# Patient Record
Sex: FEMALE | Race: WHITE | Employment: UNEMPLOYED | URBAN - METROPOLITAN AREA
[De-identification: names, ages, dates, MRNs, and addresses within clinical notes are randomized per-mention and may not be internally consistent; named-entity substitution may affect disease eponyms.]

---
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
## 2020-01-14 ENCOUNTER — OFFICE VISIT (OUTPATIENT)
Dept: INTERNAL MEDICINE | Age: 20
End: 2020-01-14
Payer: COMMERCIAL

## 2020-01-14 VITALS
HEIGHT: 63 IN | DIASTOLIC BLOOD PRESSURE: 86 MMHG | SYSTOLIC BLOOD PRESSURE: 139 MMHG | BODY MASS INDEX: 42.56 KG/M2 | WEIGHT: 240.2 LBS | HEART RATE: 90 BPM

## 2020-01-14 PROBLEM — E66.9 OBESITY WITHOUT SERIOUS COMORBIDITY IN PEDIATRIC PATIENT: Status: ACTIVE | Noted: 2020-01-14

## 2020-01-14 PROCEDURE — G8484 FLU IMMUNIZE NO ADMIN: HCPCS | Performed by: STUDENT IN AN ORGANIZED HEALTH CARE EDUCATION/TRAINING PROGRAM

## 2020-01-14 PROCEDURE — 99213 OFFICE O/P EST LOW 20 MIN: CPT | Performed by: STUDENT IN AN ORGANIZED HEALTH CARE EDUCATION/TRAINING PROGRAM

## 2020-01-14 PROCEDURE — G8427 DOCREV CUR MEDS BY ELIG CLIN: HCPCS | Performed by: STUDENT IN AN ORGANIZED HEALTH CARE EDUCATION/TRAINING PROGRAM

## 2020-01-14 PROCEDURE — 99211 OFF/OP EST MAY X REQ PHY/QHP: CPT | Performed by: INTERNAL MEDICINE

## 2020-01-14 PROCEDURE — 1036F TOBACCO NON-USER: CPT | Performed by: STUDENT IN AN ORGANIZED HEALTH CARE EDUCATION/TRAINING PROGRAM

## 2020-01-14 PROCEDURE — G8417 CALC BMI ABV UP PARAM F/U: HCPCS | Performed by: STUDENT IN AN ORGANIZED HEALTH CARE EDUCATION/TRAINING PROGRAM

## 2020-01-14 RX ORDER — DEXTROAMPHETAMINE SACCHARATE, AMPHETAMINE ASPARTATE, DEXTROAMPHETAMINE SULFATE AND AMPHETAMINE SULFATE 5; 5; 5; 5 MG/1; MG/1; MG/1; MG/1
40 TABLET ORAL DAILY
Qty: 60 TABLET | Refills: 0 | Status: SHIPPED | OUTPATIENT
Start: 2020-01-14 | End: 2020-01-20 | Stop reason: ALTCHOICE

## 2020-01-14 ASSESSMENT — PATIENT HEALTH QUESTIONNAIRE - PHQ9
SUM OF ALL RESPONSES TO PHQ9 QUESTIONS 1 & 2: 0
SUM OF ALL RESPONSES TO PHQ QUESTIONS 1-9: 0
SUM OF ALL RESPONSES TO PHQ QUESTIONS 1-9: 0
1. LITTLE INTEREST OR PLEASURE IN DOING THINGS: 0
2. FEELING DOWN, DEPRESSED OR HOPELESS: 0

## 2020-01-14 NOTE — PROGRESS NOTES
Attending Physician Statement  I have discussed the care of Aryan Dickson including pertinent history and exam findings,  with the resident. I have reviewed the key elements of all parts of the encounter with the resident. I agree with the assessment, plan and orders as documented by the resident.   (Glenis Bar)    Isha Rudolph MD  Faculty Internal Medicine/ Nephrology
Visit Information    Have you changed or started any medications since your last visit including any over-the-counter medicines, vitamins, or herbal medicines? no   Have you stopped taking any of your medications? Is so, why? -  no  Are you having any side effects from any of your medications? - no    Have you seen any other physician or provider since your last visit?  no   Have you had any other diagnostic tests since your last visit?  no   Have you been seen in the emergency room and/or had an admission in a hospital since we last saw you?  no   Have you had your routine dental cleaning in the past 6 months?  no     Do you have an active MyChart account? If no, what is the barrier?   Yes    Patient Care Team:  Mervat Jiménez MD as PCP - General (Internal Medicine)  Ivan Moon MD as Consulting Physician (Orthopedic Surgery)    Medical History Review  Past Medical, Family, and Social History reviewed and does contribute to the patient presenting condition    Health Maintenance   Topic Date Due    Pneumococcal 0-64 years Vaccine (1 of 1 - PPSV23) 05/03/2006    Flu vaccine (1) 09/01/2019    Chlamydia screen  10/01/2020    DTaP/Tdap/Td vaccine (7 - Td) 08/12/2021    HPV vaccine  Completed    Varicella Vaccine  Completed    Meningococcal (ACWY) Vaccine  Completed    HIV screen  Completed
Medications   Medication Sig Dispense Refill    albuterol sulfate  (90 Base) MCG/ACT inhaler Inhale 2 puffs into the lungs every 4 hours as needed for Wheezing 1 Inhaler 3    Spacer/Aero-Holding Chambers BERNARD 1 Device by Does not apply route daily 1 Device 0    loratadine (CLARITIN) 10 MG tablet TAKE ONE TABLET BY MOUTH DAILY 30 tablet 4    albuterol sulfate HFA (VENTOLIN HFA) 108 (90 Base) MCG/ACT inhaler Inhale 2 puffs into the lungs every 6 hours as needed for Wheezing 2 Inhaler 2    amphetamine-dextroamphetamine (ADDERALL XR) 20 MG extended release capsule Take 2 capsules by mouth every morning for 30 days. Start date 5/18/19 60 capsule 0     No current facility-administered medications for this visit. SOCIAL HISTORY    Reviewed and no change from previous record. Alex Otto  reports that she has never smoked.  She has never used smokeless tobacco.    FAMILY HISTORY:    Reviewed and No change from previous visit    REVIEW OF SYSTEMS:    CONSTITUTIONAL: Denies: fever, chills  PSYCH: Denies: anxiety, depression  ALLERGIES: Denies: urticaria  EYES: Denies: blurry vision, decreased vision, photophobia  ENT: Denies: sore throat, nasal congestion  CARDIOVASCULAR: Denies: chest pain, dyspnea on exertion  RESPIRATORY: Denies: cough, hemoptysis, shortness of breath  GI: Denies: Denies: abdominal pain, flank pain  : Denies: Denies: dysuria, frequency/urgency  NEURO: Denies: dizzy/vertigo, headache  MUSCULOSKELETAL: Denies: back pain, joint pain  SKIN: Denies: rash, itching    PHYSICAL EXAM:      Vitals:    01/14/20 1311   BP: 139/86   Site: Left Upper Arm   Position: Sitting   Cuff Size: Medium Adult   Pulse: 90   Weight: 240 lb 3.2 oz (109 kg)   Height: 5' 3\" (1.6 m)     BP Readings from Last 3 Encounters:   01/14/20 139/86   10/01/19 132/87   09/10/19 129/82      General appearance - alert, well appearing, and in no distress  Mental status - alert, oriented to person, place, and time  Mouth - mucous

## 2020-01-20 ENCOUNTER — TELEPHONE (OUTPATIENT)
Dept: INTERNAL MEDICINE | Age: 20
End: 2020-01-20

## 2020-01-20 RX ORDER — DEXTROAMPHETAMINE SACCHARATE, AMPHETAMINE ASPARTATE MONOHYDRATE, DEXTROAMPHETAMINE SULFATE AND AMPHETAMINE SULFATE 5; 5; 5; 5 MG/1; MG/1; MG/1; MG/1
40 CAPSULE, EXTENDED RELEASE ORAL EVERY MORNING
Qty: 60 CAPSULE | Refills: 0 | Status: SHIPPED | OUTPATIENT
Start: 2020-01-20 | End: 2020-01-21 | Stop reason: SDUPTHER

## 2020-01-21 RX ORDER — DEXTROAMPHETAMINE SACCHARATE, AMPHETAMINE ASPARTATE MONOHYDRATE, DEXTROAMPHETAMINE SULFATE AND AMPHETAMINE SULFATE 5; 5; 5; 5 MG/1; MG/1; MG/1; MG/1
40 CAPSULE, EXTENDED RELEASE ORAL EVERY MORNING
Qty: 60 CAPSULE | Refills: 0 | Status: SHIPPED | OUTPATIENT
Start: 2020-01-21 | End: 2020-02-18 | Stop reason: SDUPTHER

## 2020-01-21 RX ORDER — DEXTROAMPHETAMINE SACCHARATE, AMPHETAMINE ASPARTATE MONOHYDRATE, DEXTROAMPHETAMINE SULFATE AND AMPHETAMINE SULFATE 5; 5; 5; 5 MG/1; MG/1; MG/1; MG/1
40 CAPSULE, EXTENDED RELEASE ORAL EVERY MORNING
Qty: 60 CAPSULE | Refills: 0 | Status: SHIPPED | OUTPATIENT
Start: 2020-01-21 | End: 2020-01-21

## 2020-01-30 ENCOUNTER — OFFICE VISIT (OUTPATIENT)
Dept: OBGYN | Age: 20
End: 2020-01-30
Payer: COMMERCIAL

## 2020-01-30 VITALS
SYSTOLIC BLOOD PRESSURE: 138 MMHG | BODY MASS INDEX: 42.9 KG/M2 | WEIGHT: 242.2 LBS | DIASTOLIC BLOOD PRESSURE: 88 MMHG | HEART RATE: 88 BPM

## 2020-01-30 PROBLEM — R10.32 LEFT LOWER QUADRANT ABDOMINAL PAIN: Status: ACTIVE | Noted: 2020-01-30

## 2020-01-30 PROBLEM — N83.209 OVARIAN CYST: Status: ACTIVE | Noted: 2020-01-30

## 2020-01-30 PROCEDURE — G8427 DOCREV CUR MEDS BY ELIG CLIN: HCPCS | Performed by: STUDENT IN AN ORGANIZED HEALTH CARE EDUCATION/TRAINING PROGRAM

## 2020-01-30 PROCEDURE — G8484 FLU IMMUNIZE NO ADMIN: HCPCS | Performed by: STUDENT IN AN ORGANIZED HEALTH CARE EDUCATION/TRAINING PROGRAM

## 2020-01-30 PROCEDURE — 99211 OFF/OP EST MAY X REQ PHY/QHP: CPT | Performed by: STUDENT IN AN ORGANIZED HEALTH CARE EDUCATION/TRAINING PROGRAM

## 2020-01-30 PROCEDURE — 1036F TOBACCO NON-USER: CPT | Performed by: STUDENT IN AN ORGANIZED HEALTH CARE EDUCATION/TRAINING PROGRAM

## 2020-01-30 PROCEDURE — 99213 OFFICE O/P EST LOW 20 MIN: CPT | Performed by: STUDENT IN AN ORGANIZED HEALTH CARE EDUCATION/TRAINING PROGRAM

## 2020-01-30 PROCEDURE — G8417 CALC BMI ABV UP PARAM F/U: HCPCS | Performed by: STUDENT IN AN ORGANIZED HEALTH CARE EDUCATION/TRAINING PROGRAM

## 2020-01-30 NOTE — PROGRESS NOTES
History    Para Term  AB Living   0 0 0 0 0 0   SAB TAB Ectopic Molar Multiple Live Births   0 0 0 0 0 0       PAST MEDICAL HISTORY:      Diagnosis Date    Acute pyelonephritis  - 2004    ; normal cystogram and vcug     ADHD (attention deficit hyperactivity disorder) 2008    Allergic rhinitis 2010    Asthma 2011    abnormal PFT    Astigmatism     Obesity 9/3/2014    Pes planovalgus, acquired 2010    referred to ortho    Vesicoureteral reflux 04    normal cysto 06, and normal vcug 06. PAST SURGICAL HISTORY:                                                                    Procedure Laterality Date    FOOT SURGERY Left        MEDICATIONS:  Current Outpatient Medications   Medication Sig Dispense Refill    amphetamine-dextroamphetamine (ADDERALL XR) 20 MG extended release capsule Take 2 capsules by mouth every morning for 30 days. 60 capsule 0    albuterol sulfate  (90 Base) MCG/ACT inhaler Inhale 2 puffs into the lungs every 4 hours as needed for Wheezing 1 Inhaler 3    Spacer/Aero-Holding Chambers BERNARD 1 Device by Does not apply route daily 1 Device 0    loratadine (CLARITIN) 10 MG tablet TAKE ONE TABLET BY MOUTH DAILY 30 tablet 4     No current facility-administered medications for this visit. ALLERGIES:  Allergies as of 2020    (No Known Allergies)                                   VITALS:  Vitals:    20 1628   BP: 138/88   Site: Left Upper Arm   Position: Sitting   Cuff Size: Medium Adult   Pulse: 88   Weight: 242 lb 3.2 oz (109.9 kg)                                                                                                                                                                         PHYSICAL EXAM:     General Appearance: Appears healthy. Alert; in no acute distress.   Pleasant.; obese  Respiratory: clear to auscultation, no wheezes, rales or rhonchi, symmetric air entry  Cardiovascular: regular rate and rhythm, no murmurs rubs or gallops  Breast:  deferred to annual  Abdominal: Obese, negative tenderness to palpation with superficial and deep palpation, no inguinal or femoral hernias palpated bilaterally, no tenderness reproduced bilateral groin or inguinal regions. No abnormal masses. Normal umbilicus, no abdominal scars  Pelvic Exam: Declined repeat pelvic exam, previous pelvic exam did not visualize IUD strings. Rectal Exam: declined  Extremities: non-tender BLE and non-edematous  Musculoskeletal: Spine range of motion normal. Muscular strength intact. , Range of motion normal in hips, knees, shoulders, and spine. , no gross abnormalities  Psych: Normal. and Alert and oriented, appropriate affect. OMM EXAM:  The patient did not complain of a Chief complaint requiring OMM. Chief Complaint:none    Structural Exam: No Interest    DATA:  No results found for this visit on 01/30/20.   Narrative   EXAMINATION:   PELVIC ULTRASOUND       6/12/2019       TECHNIQUE:   Transabdominal and transvaginal pelvic ultrasound was performed with color   doppler flow evaluation.       COMPARISON:   None       HISTORY:   ORDERING SYSTEM PROVIDED HISTORY: Intrauterine contraceptive device threads   lost, initial encounter   TECHNOLOGIST PROVIDED HISTORY:   IUD strings not visualized       FINDINGS:       Measurements:       Uterus:  7.9 x 3.4 x 4.8 cm       Endometrial stripe:  Not well visualized due to IUD.       Right Ovary:  2.6 x 1.4 x 2.4 cm       Left Ovary:  6.8 x 2.3 x 2.6 cm           Ultrasound Findings:       Uterus: Uterus demonstrates normal myometrial echotexture.       Endometrial stripe: IUD with tip noted near the fundus.  It appears   appropriately position.  Small amount of fluid in the cervix.       Right Ovary: Right ovary is within normal limits.  There is normal arterial   and venous doppler flow.       Left Ovary:  2.0 x 2.4 x 1.4 cm simple left ovarian cyst.  There is normal   arterial and venous doppler flow.       Free Fluid: Trace free fluid which is likely physiologic.           Impression   IUD appears appropriately positioned.       Simple 2.0 x 2.4 x 1.4 cm left ovarian cyst which requires no further   follow-up.             ASSESSMENT & PLAN:    Isaac Davis is a 23 y.o. female  with LLQ abdominal cramping with  IUD (intrauterine device) in place  - [address]; Future  -Patient likens the intermittent abdominal cramping to her IUD  - Previous transvaginal ultrasound revealed appropriately positioned IUD, however had a simple left ovarian cyst at that time.  - Reassured patient that the IUD is likely not causing her pain and it is not cyclical in nature  - Patient may have some left lower quadrant pain secondary to history of left ovarian cyst, despite being small  - Discussed further management of potential cramping including removing the Mirena IUD with in office hysteroscopy utilizing Endosee or proceeding to the OR for an outpatient procedure. Patient declines both at this time and wishes to give the IUD more time to resolve the cramping.  - Patient desires to continue use of Mirena IUD at this time because it is working well for her   -Recommended alternating Tylenol 1000mg and motrin every 6 hours as needed for the pain  - She does not want the IUD pulled      Patient Active Problem List    Diagnosis Date Noted    IUD (intrauterine device) in place 2019    IUD strings lost 2019     TVUS 19 IUD in correct place near fundus      Eczema 2016    Congenital tarsal coalition 2016    Acne vulgaris 2015    Obesity 2014    Seasonal allergies 2012    Astigmatism     Pes planovalgus, acquired      Left.  ADHD (attention deficit hyperactivity disorder) 2008    Asthma 2006       Return in about 1 week for ultrasound and 2 weeks (around 2020) for follow up appointment.     Counseling Completed:  done need

## 2020-02-04 ENCOUNTER — ANCILLARY PROCEDURE (OUTPATIENT)
Dept: OBGYN | Age: 20
End: 2020-02-04
Payer: COMMERCIAL

## 2020-02-04 PROCEDURE — 76856 US EXAM PELVIC COMPLETE: CPT | Performed by: OBSTETRICS & GYNECOLOGY

## 2020-02-04 PROCEDURE — 76830 TRANSVAGINAL US NON-OB: CPT | Performed by: RADIOLOGY

## 2020-02-04 PROCEDURE — 76830 TRANSVAGINAL US NON-OB: CPT | Performed by: OBSTETRICS & GYNECOLOGY

## 2020-02-04 PROCEDURE — 76856 US EXAM PELVIC COMPLETE: CPT | Performed by: RADIOLOGY

## 2020-02-18 RX ORDER — DEXTROAMPHETAMINE SACCHARATE, AMPHETAMINE ASPARTATE MONOHYDRATE, DEXTROAMPHETAMINE SULFATE AND AMPHETAMINE SULFATE 5; 5; 5; 5 MG/1; MG/1; MG/1; MG/1
40 CAPSULE, EXTENDED RELEASE ORAL EVERY MORNING
Qty: 60 CAPSULE | Refills: 0 | Status: SHIPPED | OUTPATIENT
Start: 2020-02-18 | End: 2020-03-19 | Stop reason: SDUPTHER

## 2020-02-27 ENCOUNTER — OFFICE VISIT (OUTPATIENT)
Dept: OBGYN | Age: 20
End: 2020-02-27
Payer: COMMERCIAL

## 2020-02-27 VITALS
WEIGHT: 242 LBS | HEART RATE: 92 BPM | HEIGHT: 63 IN | SYSTOLIC BLOOD PRESSURE: 120 MMHG | BODY MASS INDEX: 42.88 KG/M2 | DIASTOLIC BLOOD PRESSURE: 79 MMHG

## 2020-02-27 PROCEDURE — 99213 OFFICE O/P EST LOW 20 MIN: CPT | Performed by: STUDENT IN AN ORGANIZED HEALTH CARE EDUCATION/TRAINING PROGRAM

## 2020-02-27 PROCEDURE — G8417 CALC BMI ABV UP PARAM F/U: HCPCS | Performed by: STUDENT IN AN ORGANIZED HEALTH CARE EDUCATION/TRAINING PROGRAM

## 2020-02-27 PROCEDURE — G8484 FLU IMMUNIZE NO ADMIN: HCPCS | Performed by: STUDENT IN AN ORGANIZED HEALTH CARE EDUCATION/TRAINING PROGRAM

## 2020-02-27 PROCEDURE — G8427 DOCREV CUR MEDS BY ELIG CLIN: HCPCS | Performed by: STUDENT IN AN ORGANIZED HEALTH CARE EDUCATION/TRAINING PROGRAM

## 2020-02-27 PROCEDURE — 1036F TOBACCO NON-USER: CPT | Performed by: STUDENT IN AN ORGANIZED HEALTH CARE EDUCATION/TRAINING PROGRAM

## 2020-02-27 NOTE — PROGRESS NOTES
body/fundus. Right Ovary: Right ovary is within normal limits. Left Ovary:  Left ovary is not visualized. Free Fluid: No evidence of free fluid. Nonvisualized left ovary, otherwise negative pelvic ultrasound with satisfactory appearing IUD. Narrative   EXAMINATION:   PELVIC ULTRASOUND       2019       TECHNIQUE:   Transabdominal and transvaginal pelvic ultrasound was performed with color   doppler flow evaluation.       COMPARISON:   None       HISTORY:   ORDERING SYSTEM PROVIDED HISTORY: Intrauterine contraceptive device threads   lost, initial encounter   TECHNOLOGIST PROVIDED HISTORY:   IUD strings not visualized       FINDINGS:       Measurements:       Uterus:  7.9 x 3.4 x 4.8 cm       Endometrial stripe:  Not well visualized due to IUD.       Right Ovary:  2.6 x 1.4 x 2.4 cm       Left Ovary:  6.8 x 2.3 x 2.6 cm           Ultrasound Findings:       Uterus: Uterus demonstrates normal myometrial echotexture.       Endometrial stripe: IUD with tip noted near the fundus.  It appears   appropriately position.  Small amount of fluid in the cervix.       Right Ovary: Right ovary is within normal limits.  There is normal arterial   and venous doppler flow.       Left Ovary:  2.0 x 2.4 x 1.4 cm simple left ovarian cyst.  There is normal   arterial and venous doppler flow.       Free Fluid: Trace free fluid which is likely physiologic.           Impression   IUD appears appropriately positioned.       Simple 2.0 x 2.4 x 1.4 cm left ovarian cyst which requires no further   follow-up.             ASSESSMENT & PLAN:    Rain Blanco is a 23 y.o. female  with LLQ abdominal cramping with  IUD (intrauterine device) in place  - pain was likely due to left ovarian cyst (likely resolved) as left ovary was not seen on US  - Previous transvaginal ultrasound revealed appropriately positioned IUD, however had a simple left ovarian cyst at that time (2019).   - Reassured patient that the IUD is likely not recommendation for cessation and avoidance. Routine health maintenance per patients PCP done. Patient was seen with total face to face time of 15 minutes. More than 50% of this visit was on counseling and education regarding her diagnose(s) as listed below and options. She was also counseled on her preventative health maintenance recommendations and follow-up. Diagnosis Orders   1. IUD (intrauterine device) in place     2. Cyst of left ovary     3. Left lower quadrant abdominal pain     4.  [address], DO  Ob/Gyn Resident  [facility], [address]  2/27/2020, 3:27 PM

## 2020-02-27 NOTE — PROGRESS NOTES
Attending Physician Statement  I have discussed the care of Evan Snyder, including pertinent history and exam findings,  with the resident. I have reviewed the key elements of all parts of the encounter with the resident. I agree with the assessment, plan and orders as documented by the resident.   (GE Modifier)

## 2020-02-28 ENCOUNTER — OFFICE VISIT (OUTPATIENT)
Dept: INTERNAL MEDICINE | Age: 20
End: 2020-02-28
Payer: COMMERCIAL

## 2020-02-28 VITALS
HEART RATE: 89 BPM | WEIGHT: 242.4 LBS | SYSTOLIC BLOOD PRESSURE: 137 MMHG | DIASTOLIC BLOOD PRESSURE: 100 MMHG | HEIGHT: 63 IN | BODY MASS INDEX: 42.95 KG/M2

## 2020-02-28 PROCEDURE — 1036F TOBACCO NON-USER: CPT | Performed by: STUDENT IN AN ORGANIZED HEALTH CARE EDUCATION/TRAINING PROGRAM

## 2020-02-28 PROCEDURE — 99214 OFFICE O/P EST MOD 30 MIN: CPT | Performed by: STUDENT IN AN ORGANIZED HEALTH CARE EDUCATION/TRAINING PROGRAM

## 2020-02-28 PROCEDURE — 99211 OFF/OP EST MAY X REQ PHY/QHP: CPT | Performed by: INTERNAL MEDICINE

## 2020-02-28 PROCEDURE — G8417 CALC BMI ABV UP PARAM F/U: HCPCS | Performed by: STUDENT IN AN ORGANIZED HEALTH CARE EDUCATION/TRAINING PROGRAM

## 2020-02-28 PROCEDURE — G8484 FLU IMMUNIZE NO ADMIN: HCPCS | Performed by: STUDENT IN AN ORGANIZED HEALTH CARE EDUCATION/TRAINING PROGRAM

## 2020-02-28 PROCEDURE — G8427 DOCREV CUR MEDS BY ELIG CLIN: HCPCS | Performed by: STUDENT IN AN ORGANIZED HEALTH CARE EDUCATION/TRAINING PROGRAM

## 2020-02-28 RX ORDER — DIAPER,BRIEF,INFANT-TODD,DISP
EACH MISCELLANEOUS
Qty: 1 TUBE | Refills: 1 | Status: SHIPPED | OUTPATIENT
Start: 2020-02-28 | End: 2020-03-06

## 2020-02-28 RX ORDER — ALBUTEROL SULFATE 90 UG/1
2 AEROSOL, METERED RESPIRATORY (INHALATION) EVERY 4 HOURS PRN
Qty: 1 INHALER | Refills: 3 | Status: CANCELLED | OUTPATIENT
Start: 2020-02-28

## 2020-02-28 RX ORDER — LORATADINE 10 MG/1
TABLET ORAL
Qty: 30 TABLET | Refills: 4 | Status: CANCELLED | OUTPATIENT
Start: 2020-02-28

## 2020-02-28 RX ORDER — IODINE/SODIUM IODIDE 2 %
1 TINCTURE TOPICAL PRN
Qty: 1 BOTTLE | Refills: 2 | Status: SHIPPED | OUTPATIENT
Start: 2020-02-28 | End: 2020-08-17

## 2020-02-28 RX ORDER — DEXTROAMPHETAMINE SACCHARATE, AMPHETAMINE ASPARTATE MONOHYDRATE, DEXTROAMPHETAMINE SULFATE AND AMPHETAMINE SULFATE 5; 5; 5; 5 MG/1; MG/1; MG/1; MG/1
40 CAPSULE, EXTENDED RELEASE ORAL EVERY MORNING
Qty: 60 CAPSULE | Refills: 0 | Status: CANCELLED | OUTPATIENT
Start: 2020-02-28 | End: 2020-03-29

## 2020-02-28 NOTE — PROGRESS NOTES
Attending Physician Statement  I have discussed the care of Aryan Dickson including pertinent history and exam findings,  with the resident. I have reviewed the key elements of all parts of the encounter with the resident. I agree with the assessment, plan and orders as documented by the resident.   (Christie Henriquez)    Nathan Brandon MD  Faculty Internal Medicine/ Nephrology

## 2020-02-28 NOTE — PROGRESS NOTES
@[facility]@    [facility]    Progress Note    Date of patient's visit: 2/28/2020    Patient's Name:  Shaan Kearns    YOB: 2000            Patient Care Team:  Deann Todd MD as PCP - General (Internal Medicine)  Praveen Bryant MD as Consulting Physician (Orthopedic Surgery)    REASON FOR VISIT: Routine outpatient follow up    HISTORY OF PRESENT ILLNESS:    History was obtained from the patient. Shaan Kearns is a 23 y.o. female with past medical history of  -ADHD, on adderall 20 mg XR capsule  -Asthma, no recent exacerbation or use of rescue inhalers. Last seen in the office for follow-up of ADHD and health maintenance on 1/14/2020. Today, the patient came in with chief complaint of a rash that started few days back. She describes the rash as scattered, round, varying in size from coin shaped to pea-sized lesions, erythematous, itchy, localized to bilateral feet and lower part of bilateral legs. She denies any bleeding or purulent drainage from the site. She denies any recent travels, recent change in medications, associated sick contacts, other associated symptoms. She denies having similar rash in the past.  She has past medical history of asthma, however no recent exacerbations, no use of rescue inhalers. Today vitals revealed blood pressure 149/79 mmHg, 127/100 mmHg on repeat testing. She has class II obesity, has been counseled on weight loss and balanced diet. Past Medical History:   Diagnosis Date    Acute pyelonephritis 11/8 - 11/11/2004    ; normal cystogram and vcug 2006    ADHD (attention deficit hyperactivity disorder) 5/2008    Allergic rhinitis 8/2010    Asthma 8/2011    abnormal PFT    Astigmatism     Obesity 9/3/2014    Pes planovalgus, acquired 12/2010    referred to ortho    Vesicoureteral reflux 11/9/04    normal cysto 11/2/06, and normal vcug 5/19/06.        Past Surgical History: Procedure Laterality Date    FOOT SURGERY Left          ALLERGIES    No Known Allergies    MEDICATIONS:      Current Outpatient Medications on File Prior to Visit   Medication Sig Dispense Refill    amphetamine-dextroamphetamine (ADDERALL XR) 20 MG extended release capsule Take 2 capsules by mouth every morning for 30 days. 60 capsule 0    albuterol sulfate  (90 Base) MCG/ACT inhaler Inhale 2 puffs into the lungs every 4 hours as needed for Wheezing 1 Inhaler 3    Spacer/Aero-Holding Chambers BERNARD 1 Device by Does not apply route daily 1 Device 0    loratadine (CLARITIN) 10 MG tablet TAKE ONE TABLET BY MOUTH DAILY 30 tablet 4     No current facility-administered medications on file prior to visit. SOCIAL HISTORY    Reviewed and no change from previous record. Laurell Claude  reports that she has never smoked. She has never used smokeless tobacco.    FAMILY HISTORY:    Reviewed and No change from previous visit    HEALTH MAINTENANCE DUE:      Health Maintenance Due   Topic Date Due    Pneumococcal 0-64 years Vaccine (1 of 1 - PPSV23) 05/03/2006       REVIEW OF SYSTEMS:    12 point review of symptoms completed and found to be normal except noted in the HPI    · Constitutional: Negative for Fever, chills  · Eyes: Negative for visual changes, diplopia  · ENT: Negative for mouth sores, sore throat. · Cardiovascular: Negative for lightheadedness ,chest pain, palpitations   · Respiratory:Negative for Shortness of breath,cough or wheezing. · Gastrointestinal: Negative for nausea/vomiting, change in bowel habits, abdominal pain  · Genitourinary:Negative for change in bladder habits, dysuria, hematuria.   · Musculoskeletal: Negative for joint pain   · Neurological: Negative for headache, change in muscle strength numbness/tingling       PHYSICAL EXAM:      Vitals:    02/28/20 1548 02/28/20 1602   BP: (!) 149/79 (!) 137/100   Site: Left Upper Arm    Position: Sitting    Cuff Size: Medium Adult    Pulse: 93 89 allergy  - Calamine 8-8 % LOTN lotion; Apply 1 applicator topically as needed (Rash, itching)  Dispense: 1 Bottle; Refill: 2  - hydrocortisone (ALA-THEODORE) 1 % cream; Apply topically 2 times daily. Dispense: 1 Tube; Refill: 1  - To rule out autoimmune cause in case of progression of the rash - DEMARIO Screen With Reflex; Future, Rheumatoid Factor    2. Attention deficit hyperactivity disorder (ADHD), other type  - Continue Adderall - refill provided in previous visit in Jan 2020    3. Mild intermittent asthma without complication  - No recent use or rescue inhalers or exacerbation    4. H/O Seasonal allergies    5. Class 2 obesity due to excess calories without serious comorbidity with body mass index (BMI) of 38.0 to 38.9 in adult      FOLLOW UP AND INSTRUCTIONS:   Follow up in 6 months, return in case of worsening of the rash. 1. Tiffanie received counseling on the following healthy behaviors: exercise, nutrition. 2. Reviewed prior labs and health maintenance. 3. Discussed use, benefit, and side effects of prescribed medications. Barriers to medication compliance addressed. All patient questions answered. Pt voiced understanding.      4. Patient given educational materials - see patient instructions      Ratna Gleason MD      Department of Internal Medicine  [facility]         2/28/2020, 4:22 PM

## 2020-03-17 NOTE — TELEPHONE ENCOUNTER
Request for albuterol, adderall - medications pended. Please fill if appropriate. I am routing to you as you can e-scribe the adderall to avoid delay.       Next Visit Date:  Future Appointments   Date Time Provider Suyapa Thomas   6/30/2020  1:00 PM Edmond Spurling, MD [address] [place] [address] [address] Maintenance   Topic Date Due    Pneumococcal 0-64 years Vaccine (1 of 1 - PPSV23) 05/03/2006    Flu vaccine (1) 01/30/2021 (Originally 9/1/2019)    Chlamydia screen  10/01/2020    DTaP/Tdap/Td vaccine (7 - Td) 08/12/2021    Shingles Vaccine (1 of 2) 05/03/2050    Hepatitis A vaccine  Completed    Hepatitis B vaccine  Completed    Hib vaccine  Completed    HPV vaccine  Completed    Varicella vaccine  Completed    Meningococcal (ACWY) vaccine  Completed    HIV screen  Completed       Hemoglobin A1C (%)   Date Value   09/13/2019 4.9             ( goal A1C is < 7)   No results found for: LABMICR  LDL Cholesterol (mg/dL)   Date Value   09/13/2019 107       (goal LDL is <100)   No results found for: AST, ALT, BUN  BP Readings from Last 3 Encounters:   02/28/20 (!) 137/100   02/27/20 120/79   01/30/20 138/88          (goal 120/80)    All Future Testing planned in [facility]  Lab Frequency Next Occurrence   DEMARIO Screen With Reflex Once 05/09/2020   Rheumatoid Factor Once 05/09/2020         Patient Active Problem List:     ADHD (attention deficit hyperactivity disorder)     Asthma     Pes planovalgus, acquired     Astigmatism     Seasonal allergies     Obesity     Acne vulgaris     Congenital tarsal coalition     Eczema     IUD strings lost     IUD (intrauterine device) in place     Left lower quadrant abdominal pain     Ovarian cyst

## 2020-03-19 RX ORDER — DEXTROAMPHETAMINE SACCHARATE, AMPHETAMINE ASPARTATE MONOHYDRATE, DEXTROAMPHETAMINE SULFATE AND AMPHETAMINE SULFATE 5; 5; 5; 5 MG/1; MG/1; MG/1; MG/1
40 CAPSULE, EXTENDED RELEASE ORAL EVERY MORNING
Qty: 60 CAPSULE | Refills: 0 | Status: SHIPPED | OUTPATIENT
Start: 2020-03-19 | End: 2020-04-17 | Stop reason: SDUPTHER

## 2020-03-19 RX ORDER — ALBUTEROL SULFATE 90 UG/1
2 AEROSOL, METERED RESPIRATORY (INHALATION) EVERY 4 HOURS PRN
Qty: 1 INHALER | Refills: 3 | Status: SHIPPED | OUTPATIENT
Start: 2020-03-19 | End: 2020-04-17 | Stop reason: SDUPTHER

## 2020-04-20 RX ORDER — DEXTROAMPHETAMINE SACCHARATE, AMPHETAMINE ASPARTATE MONOHYDRATE, DEXTROAMPHETAMINE SULFATE AND AMPHETAMINE SULFATE 5; 5; 5; 5 MG/1; MG/1; MG/1; MG/1
40 CAPSULE, EXTENDED RELEASE ORAL EVERY MORNING
Qty: 60 CAPSULE | Refills: 0 | Status: SHIPPED | OUTPATIENT
Start: 2020-04-20 | End: 2020-04-21 | Stop reason: SDUPTHER

## 2020-04-20 RX ORDER — ALBUTEROL SULFATE 90 UG/1
2 AEROSOL, METERED RESPIRATORY (INHALATION) EVERY 4 HOURS PRN
Qty: 1 INHALER | Refills: 3 | Status: SHIPPED | OUTPATIENT
Start: 2020-04-20 | End: 2020-07-14 | Stop reason: SDUPTHER

## 2020-04-21 RX ORDER — DEXTROAMPHETAMINE SACCHARATE, AMPHETAMINE ASPARTATE MONOHYDRATE, DEXTROAMPHETAMINE SULFATE AND AMPHETAMINE SULFATE 5; 5; 5; 5 MG/1; MG/1; MG/1; MG/1
40 CAPSULE, EXTENDED RELEASE ORAL EVERY MORNING
Qty: 60 CAPSULE | Refills: 0 | Status: SHIPPED | OUTPATIENT
Start: 2020-04-21 | End: 2020-05-09 | Stop reason: SDUPTHER

## 2020-06-25 RX ORDER — DEXTROAMPHETAMINE SACCHARATE, AMPHETAMINE ASPARTATE MONOHYDRATE, DEXTROAMPHETAMINE SULFATE AND AMPHETAMINE SULFATE 5; 5; 5; 5 MG/1; MG/1; MG/1; MG/1
40 CAPSULE, EXTENDED RELEASE ORAL EVERY MORNING
Qty: 60 CAPSULE | Refills: 0 | Status: SHIPPED | OUTPATIENT
Start: 2020-06-25 | End: 2020-07-21 | Stop reason: SDUPTHER

## 2020-06-26 RX ORDER — DEXTROAMPHETAMINE SACCHARATE, AMPHETAMINE ASPARTATE MONOHYDRATE, DEXTROAMPHETAMINE SULFATE AND AMPHETAMINE SULFATE 5; 5; 5; 5 MG/1; MG/1; MG/1; MG/1
40 CAPSULE, EXTENDED RELEASE ORAL EVERY MORNING
Qty: 60 CAPSULE | Refills: 0 | OUTPATIENT
Start: 2020-06-26 | End: 2020-07-26

## 2020-07-13 NOTE — TELEPHONE ENCOUNTER
Pt calling for medication refill albuterol inhaler. She missed her last appt and is unable to reschedule at this time because she doesn't know her work schedule. Pt would also like an prescription strength dorant, she has tried every single OTC kind and nothing is helping.

## 2020-07-14 RX ORDER — ALBUTEROL SULFATE 90 UG/1
2 AEROSOL, METERED RESPIRATORY (INHALATION) EVERY 4 HOURS PRN
Qty: 1 INHALER | Refills: 3 | Status: SHIPPED | OUTPATIENT
Start: 2020-07-14

## 2020-07-14 NOTE — TELEPHONE ENCOUNTER
Abhishek Kay,  What do yo mean by Ryan Kwan? I refilled her albuterol.     Thank you    Julio Viramontes MD, PGY-2  Internal Medicine Residency Program  [facility]  7/14/2020 3:01 PM

## 2020-07-22 NOTE — TELEPHONE ENCOUNTER
Request for adderall - med pended. Please fill if appropriate. Next Visit Date:  No future appointments.     Health Maintenance   Topic Date Due    Pneumococcal 0-64 years Vaccine (1 of 1 - PPSV23) 05/03/2006    Flu vaccine (1) 09/01/2020    Chlamydia screen  10/01/2020    DTaP/Tdap/Td vaccine (7 - Td) 08/12/2021    Hepatitis A vaccine  Completed    Hepatitis B vaccine  Completed    Hib vaccine  Completed    HPV vaccine  Completed    Varicella vaccine  Completed    Meningococcal (ACWY) vaccine  Completed    HIV screen  Completed       Hemoglobin A1C (%)   Date Value   09/13/2019 4.9             ( goal A1C is < 7)   No results found for: LABMICR  LDL Cholesterol (mg/dL)   Date Value   09/13/2019 107       (goal LDL is <100)   No results found for: AST, ALT, BUN  BP Readings from Last 3 Encounters:   02/28/20 (!) 137/100   02/27/20 120/79   01/30/20 138/88          (goal 120/80)    All Future Testing planned in [facility]  Lab Frequency Next Occurrence   DEMARIO Screen With Reflex Once 10/13/2020   Rheumatoid Factor Once 10/13/2020         Patient Active Problem List:     ADHD (attention deficit hyperactivity disorder)     Asthma     Pes planovalgus, acquired     Astigmatism     Seasonal allergies     Obesity     Acne vulgaris     Congenital tarsal coalition     Eczema     IUD strings lost     IUD (intrauterine device) in place     Left lower quadrant abdominal pain     Ovarian cyst

## 2020-07-24 RX ORDER — DEXTROAMPHETAMINE SACCHARATE, AMPHETAMINE ASPARTATE MONOHYDRATE, DEXTROAMPHETAMINE SULFATE AND AMPHETAMINE SULFATE 5; 5; 5; 5 MG/1; MG/1; MG/1; MG/1
40 CAPSULE, EXTENDED RELEASE ORAL EVERY MORNING
Qty: 60 CAPSULE | Refills: 0 | Status: SHIPPED | OUTPATIENT
Start: 2020-07-24 | End: 2020-08-17 | Stop reason: SDUPTHER

## 2020-07-24 RX ORDER — DEXTROAMPHETAMINE SACCHARATE, AMPHETAMINE ASPARTATE MONOHYDRATE, DEXTROAMPHETAMINE SULFATE AND AMPHETAMINE SULFATE 5; 5; 5; 5 MG/1; MG/1; MG/1; MG/1
40 CAPSULE, EXTENDED RELEASE ORAL EVERY MORNING
Qty: 60 CAPSULE | Refills: 0 | Status: SHIPPED | OUTPATIENT
Start: 2020-07-24 | End: 2020-07-24

## 2020-07-24 RX ORDER — DEXTROAMPHETAMINE SACCHARATE, AMPHETAMINE ASPARTATE MONOHYDRATE, DEXTROAMPHETAMINE SULFATE AND AMPHETAMINE SULFATE 5; 5; 5; 5 MG/1; MG/1; MG/1; MG/1
40 CAPSULE, EXTENDED RELEASE ORAL EVERY MORNING
Qty: 60 CAPSULE | Refills: 0 | OUTPATIENT
Start: 2020-07-24 | End: 2020-08-23

## 2020-07-27 NOTE — TELEPHONE ENCOUNTER
UNABLE TO LOCATE ORIGIAL SCRIPT FROM 07/24/2020 FROM DR SMITH'S [facility]

## 2020-07-28 NOTE — TELEPHONE ENCOUNTER
Printed script found, PC to pt LM on VM that script is ready for .  Printed script taken downstairs at screen desk

## 2020-08-17 ENCOUNTER — OFFICE VISIT (OUTPATIENT)
Dept: INTERNAL MEDICINE | Age: 20
End: 2020-08-17
Payer: COMMERCIAL

## 2020-08-17 ENCOUNTER — HOSPITAL ENCOUNTER (OUTPATIENT)
Age: 20
Setting detail: SPECIMEN
Discharge: HOME | End: 2020-08-17
Payer: COMMERCIAL

## 2020-08-17 VITALS
HEART RATE: 66 BPM | DIASTOLIC BLOOD PRESSURE: 82 MMHG | WEIGHT: 231.2 LBS | BODY MASS INDEX: 38.52 KG/M2 | HEIGHT: 65 IN | SYSTOLIC BLOOD PRESSURE: 121 MMHG

## 2020-08-17 LAB — RHEUMATOID FACTOR: <10 IU/ML

## 2020-08-17 PROCEDURE — 99213 OFFICE O/P EST LOW 20 MIN: CPT | Performed by: INTERNAL MEDICINE

## 2020-08-17 PROCEDURE — 99211 OFF/OP EST MAY X REQ PHY/QHP: CPT | Performed by: INTERNAL MEDICINE

## 2020-08-17 PROCEDURE — 1036F TOBACCO NON-USER: CPT | Performed by: INTERNAL MEDICINE

## 2020-08-17 PROCEDURE — G8417 CALC BMI ABV UP PARAM F/U: HCPCS | Performed by: INTERNAL MEDICINE

## 2020-08-17 PROCEDURE — G8427 DOCREV CUR MEDS BY ELIG CLIN: HCPCS | Performed by: INTERNAL MEDICINE

## 2020-08-17 RX ORDER — LORATADINE 10 MG/1
10 TABLET ORAL DAILY
Qty: 30 TABLET | Refills: 4 | Status: SHIPPED | OUTPATIENT
Start: 2020-08-17 | End: 2020-11-21 | Stop reason: SDUPTHER

## 2020-08-17 RX ORDER — DEXTROAMPHETAMINE SACCHARATE, AMPHETAMINE ASPARTATE MONOHYDRATE, DEXTROAMPHETAMINE SULFATE AND AMPHETAMINE SULFATE 5; 5; 5; 5 MG/1; MG/1; MG/1; MG/1
40 CAPSULE, EXTENDED RELEASE ORAL EVERY MORNING
Qty: 60 CAPSULE | Refills: 0 | Status: SHIPPED | OUTPATIENT
Start: 2020-08-17 | End: 2020-09-15 | Stop reason: SDUPTHER

## 2020-08-17 RX ORDER — ALBUTEROL SULFATE 2.5 MG/3ML
2.5 SOLUTION RESPIRATORY (INHALATION) EVERY 6 HOURS PRN
Qty: 120 EACH | Refills: 4 | Status: SHIPPED | OUTPATIENT
Start: 2020-08-17

## 2020-08-17 RX ORDER — HYDROCODONE BITARTRATE 100 %
1 POWDER (GRAM) MISCELLANEOUS NIGHTLY
Qty: 500 G | Refills: 0 | Status: SHIPPED | OUTPATIENT
Start: 2020-08-17 | End: 2021-03-23

## 2020-08-17 RX ORDER — NAPROXEN 500 MG/1
TABLET ORAL PRN
COMMUNITY
Start: 2020-07-17 | End: 2020-08-20 | Stop reason: SDUPTHER

## 2020-08-17 RX ORDER — DIAPER,BRIEF,INFANT-TODD,DISP
EACH MISCELLANEOUS
COMMUNITY
Start: 2020-08-11 | End: 2021-07-01 | Stop reason: ALTCHOICE

## 2020-08-17 NOTE — PROGRESS NOTES
[facility]      Date of Patient's Visit: 8/17/2020    Progress note    Patient Care Team:  Sera Marshall MD as PCP - General (Internal Medicine)  Vania Juarez MD as Consulting Physician (Orthopedic Surgery)      CHIEF COMPLAINT  Chief Complaint   Patient presents with   Chung Richards New Doctor   [facility] Maintenance     decline pneu    Excessive Sweating     under arm monk, wants medication    Medication Refill     for breathing machine       SUBJECTIVE  Tia Fossa is a 21 y.o. female who presents f/u fpor adderral refill   No signs of side effects of abuse   She has lost slight weight but that fluctuates   She works at [employer] and works day shifts . She has tried low doses in the past and is currently refusing to try 30 mg dose    C/o hyperhidrosis without complications of folliculitis   She has tried many over the counter medications     Rash , erythema on face, nasal bridge and legs  ,  DEMARIO still pending from last visit       ROS  All other review of systems negative, except for those noted. Review of Systems    Past Medical History:   Diagnosis Date    Acute pyelonephritis 11/8 - 11/11/2004    ; normal cystogram and vcug 2006    ADHD (attention deficit hyperactivity disorder) 5/2008    Allergic rhinitis 8/2010    Asthma 8/2011    abnormal PFT    Astigmatism     Obesity 9/3/2014    Pes planovalgus, acquired 12/2010    referred to ortho    Vesicoureteral reflux 11/9/04    normal cysto 11/2/06, and normal vcug 5/19/06.        Past Surgical History:   Procedure Laterality Date    FOOT SURGERY Left        Family History   Problem Relation Age of Onset    Allergies Mother     Obesity Father     Allergies Brother     Other Brother         learning problems    Asthma Brother     Arthritis Neg Hx     Birth Defects Neg Hx     Cancer Neg Hx     Depression Neg Hx     Diabetes Neg Hx     Early Death Neg Hx     Hearing Loss Neg Hx     NA, K, CL, CO2, BUN, CREATININE, GLUCOSE  HEMOGLOBIN A1C:   Lab Results   Component Value Date    LABA1C 4.9 09/13/2019     MICROALBUMIN URINE: No results found for: MICROALBUR  FASTING LIPID PANEL:   Lab Results   Component Value Date    CHOL 184 09/13/2019    HDL 51 09/13/2019    TRIG 129 09/13/2019     Lab Results   Component Value Date    LDLCHOLESTEROL 107 09/13/2019     LIVER PROFILE: No results found for: ALT, AST, PROT, BILITOT, BILIDIR, LABALBU   THYROID FUNCTION: No results found for: TSH   URINE ANALYSIS: No results found for: [address]    1. Attention deficit hyperactivity disorder (ADHD), other type    - amphetamine-dextroamphetamine (ADDERALL XR) 20 MG extended release capsule; Take 2 capsules by mouth every morning for 30 days. Dispense: 60 capsule; Refill: 0    2. Morbidly obese ([address]      3. Mild intermittent asthma without complication    - albuterol (PROVENTIL) (2.5 MG/3ML) 0.083% nebulizer solution; Take 3 mLs by nebulization every 6 hours as needed for Wheezing  Dispense: 120 each; Refill: 4  - loratadine (CLARITIN) 10 MG tablet; Take 1 tablet by mouth daily  Dispense: 30 tablet; Refill: 4    4. Hyperhidrosis of axilla  - Aluminum Chloride Hexahydrate POWD; 1 Applicatorful by Does not apply route nightly  Dispense: 500 g; Refill: 0            Follow up Instructions:    No follow-ups on file. 1. Reviewed prior labs and health maintenance. 2. Discussed use, benefit, and side effects of prescribed medications. Barriers to medication compliance addressed. All patient questions answered. Pt voiced understanding.      3. Patient given educational materials - see patient instructions      MD FANNY Gray  Attending Physician, [address] [place] [address], Internal Medicine Residency Program  [address] [place] [address]  8/17/2020, 12:37 PM

## 2020-08-18 LAB — ANTI-NUCLEAR ANTIBODY (ANA): NEGATIVE

## 2020-08-20 NOTE — TELEPHONE ENCOUNTER
Request for naproxen - med pended. Please fill if appropriate. Next Visit Date:  No future appointments.     Health Maintenance   Topic Date Due    Pneumococcal 0-64 years Vaccine (1 of 1 - PPSV23) 08/17/2021 (Originally 5/3/2006)    Flu vaccine (1) 09/01/2020    Chlamydia screen  10/01/2020    DTaP/Tdap/Td vaccine (7 - Td) 08/12/2021    Hepatitis A vaccine  Completed    Hepatitis B vaccine  Completed    Hib vaccine  Completed    HPV vaccine  Completed    Varicella vaccine  Completed    Meningococcal (ACWY) vaccine  Completed    HIV screen  Completed       Hemoglobin A1C (%)   Date Value   09/13/2019 4.9             ( goal A1C is < 7)   No results found for: LABMICR  LDL Cholesterol (mg/dL)   Date Value   09/13/2019 107       (goal LDL is <100)   No results found for: AST, ALT, BUN  BP Readings from Last 3 Encounters:   08/17/20 121/82   02/28/20 (!) 137/100   02/27/20 120/79          (goal 120/80)    All Future Testing planned in [facility]        Patient Active Problem List:     ADHD (attention deficit hyperactivity disorder)     Asthma     Pes planovalgus, acquired     Astigmatism     Seasonal allergies     Morbidly obese (HCC)     Acne vulgaris     Congenital tarsal coalition     Eczema     IUD strings lost     IUD (intrauterine device) in place     Left lower quadrant abdominal pain     Ovarian cyst

## 2020-08-21 RX ORDER — NAPROXEN 500 MG/1
500 TABLET ORAL 2 TIMES DAILY WITH MEALS
Qty: 60 TABLET | Refills: 6 | Status: SHIPPED | OUTPATIENT
Start: 2020-08-21

## 2020-09-15 NOTE — TELEPHONE ENCOUNTER
Refill for adderall           Next Visit Date:  No future appointments.     Health Maintenance   Topic Date Due    Flu vaccine (1) 09/01/2020    Chlamydia screen  10/01/2020    Pneumococcal 0-64 years Vaccine (1 of 1 - PPSV23) 08/17/2021 (Originally 5/3/2006)    DTaP/Tdap/Td vaccine (7 - Td) 08/12/2021    Hepatitis A vaccine  Completed    Hepatitis B vaccine  Completed    Hib vaccine  Completed    HPV vaccine  Completed    Varicella vaccine  Completed    Meningococcal (ACWY) vaccine  Completed    HIV screen  Completed       Hemoglobin A1C (%)   Date Value   09/13/2019 4.9             ( goal A1C is < 7)   No results found for: LABMICR  LDL Cholesterol (mg/dL)   Date Value   09/13/2019 107       (goal LDL is <100)   No results found for: AST, ALT, BUN  BP Readings from Last 3 Encounters:   08/17/20 121/82   02/28/20 (!) 137/100   02/27/20 120/79          (goal 120/80)    All Future Testing planned in [facility]              Patient Active Problem List:     ADHD (attention deficit hyperactivity disorder)     Asthma     Pes planovalgus, acquired     Astigmatism     Seasonal allergies     Morbidly obese (HCC)     Acne vulgaris     Congenital tarsal coalition     Eczema     IUD strings lost     IUD (intrauterine device) in place     Left lower quadrant abdominal pain     Ovarian cyst

## 2020-09-18 RX ORDER — DEXTROAMPHETAMINE SACCHARATE, AMPHETAMINE ASPARTATE MONOHYDRATE, DEXTROAMPHETAMINE SULFATE AND AMPHETAMINE SULFATE 5; 5; 5; 5 MG/1; MG/1; MG/1; MG/1
40 CAPSULE, EXTENDED RELEASE ORAL EVERY MORNING
Qty: 60 CAPSULE | Refills: 0 | Status: SHIPPED | OUTPATIENT
Start: 2020-09-18 | End: 2020-10-22 | Stop reason: SDUPTHER

## 2020-10-07 ENCOUNTER — HOSPITAL ENCOUNTER (OUTPATIENT)
Age: 20
Setting detail: SPECIMEN
Discharge: HOME | End: 2020-10-07
Payer: COMMERCIAL

## 2020-10-07 LAB
ABSOLUTE EOS #: 0.06 K/UL (ref 0–0.44)
ABSOLUTE IMMATURE GRANULOCYTE: <0.03 K/UL (ref 0–0.3)
ABSOLUTE LYMPH #: 1.17 K/UL (ref 1.2–5.2)
ABSOLUTE MONO #: 0.39 K/UL (ref 0.1–1.4)
ALT SERPL-CCNC: 10 U/L (ref 5–33)
BASOPHILS # BLD: 0 % (ref 0–2)
BASOPHILS ABSOLUTE: <0.03 K/UL (ref 0–0.2)
CREAT SERPL-MCNC: 0.57 MG/DL (ref 0.5–0.9)
DIFFERENTIAL TYPE: ABNORMAL
EOSINOPHILS RELATIVE PERCENT: 1 % (ref 1–4)
GFR AFRICAN AMERICAN: >60 ML/MIN
GFR NON-AFRICAN AMERICAN: >60 ML/MIN
GFR SERPL CREATININE-BSD FRML MDRD: NORMAL ML/MIN/{1.73_M2}
GFR SERPL CREATININE-BSD FRML MDRD: NORMAL ML/MIN/{1.73_M2}
HCT VFR BLD CALC: 41.3 % (ref 36.3–47.1)
HEMOGLOBIN: 13.5 G/DL (ref 11.9–15.1)
IMMATURE GRANULOCYTES: 0 %
LYMPHOCYTES # BLD: 21 % (ref 25–45)
MCH RBC QN AUTO: 27.1 PG (ref 25.2–33.5)
MCHC RBC AUTO-ENTMCNC: 32.7 G/DL (ref 28.4–34.8)
MCV RBC AUTO: 82.8 FL (ref 82.6–102.9)
MONOCYTES # BLD: 7 % (ref 2–8)
NRBC AUTOMATED: 0 PER 100 WBC
PDW BLD-RTO: 13.2 % (ref 11.8–14.4)
PLATELET # BLD: 229 K/UL (ref 138–453)
PLATELET ESTIMATE: ABNORMAL
PMV BLD AUTO: 10.8 FL (ref 8.1–13.5)
RBC # BLD: 4.99 M/UL (ref 3.95–5.11)
RBC # BLD: ABNORMAL 10*6/UL
RHEUMATOID FACTOR: <10 IU/ML
SEDIMENTATION RATE, ERYTHROCYTE: 30 MM (ref 0–20)
SEG NEUTROPHILS: 71 % (ref 34–64)
SEGMENTED NEUTROPHILS ABSOLUTE COUNT: 3.89 K/UL (ref 1.8–8)
WBC # BLD: 5.5 K/UL (ref 4.5–13.5)
WBC # BLD: ABNORMAL 10*3/UL

## 2020-10-08 LAB
ANTI-NUCLEAR ANTIBODY (ANA): NEGATIVE
LATEX IGE: <0.34 KU/L (ref 0–0.34)

## 2020-10-22 NOTE — TELEPHONE ENCOUNTER
Request for adderall - med pended. Please fill if appropriate. Next Visit Date:  No future appointments.     Health Maintenance   Topic Date Due    Flu vaccine (1) 09/01/2020    Chlamydia screen  10/01/2020    Pneumococcal 0-64 years Vaccine (1 of 1 - PPSV23) 08/17/2021 (Originally 5/3/2006)    DTaP/Tdap/Td vaccine (7 - Td) 08/12/2021    Hepatitis A vaccine  Completed    Hepatitis B vaccine  Completed    Hib vaccine  Completed    HPV vaccine  Completed    Varicella vaccine  Completed    Meningococcal (ACWY) vaccine  Completed    HIV screen  Completed       Hemoglobin A1C (%)   Date Value   09/13/2019 4.9             ( goal A1C is < 7)   No results found for: LABMICR  LDL Cholesterol (mg/dL)   Date Value   09/13/2019 107       (goal LDL is <100)   ALT (U/L)   Date Value   10/07/2020 10     BP Readings from Last 3 Encounters:   08/17/20 121/82   02/28/20 (!) 137/100   02/27/20 120/79          (goal 120/80)    All Future Testing planned in [facility]        Patient Active Problem List:     ADHD (attention deficit hyperactivity disorder)     Asthma     Pes planovalgus, acquired     Astigmatism     Seasonal allergies     Morbidly obese (HCC)     Acne vulgaris     Congenital tarsal coalition     Eczema     IUD strings lost     IUD (intrauterine device) in place     Left lower quadrant abdominal pain     Ovarian cyst

## 2020-10-23 RX ORDER — DEXTROAMPHETAMINE SACCHARATE, AMPHETAMINE ASPARTATE MONOHYDRATE, DEXTROAMPHETAMINE SULFATE AND AMPHETAMINE SULFATE 5; 5; 5; 5 MG/1; MG/1; MG/1; MG/1
40 CAPSULE, EXTENDED RELEASE ORAL EVERY MORNING
Qty: 60 CAPSULE | Refills: 0 | Status: SHIPPED | OUTPATIENT
Start: 2020-10-23 | End: 2020-11-19 | Stop reason: SDUPTHER

## 2020-11-20 RX ORDER — LORATADINE 10 MG/1
10 TABLET ORAL DAILY
Qty: 30 TABLET | Refills: 4 | OUTPATIENT
Start: 2020-11-20

## 2020-11-20 RX ORDER — DEXTROAMPHETAMINE SACCHARATE, AMPHETAMINE ASPARTATE MONOHYDRATE, DEXTROAMPHETAMINE SULFATE AND AMPHETAMINE SULFATE 5; 5; 5; 5 MG/1; MG/1; MG/1; MG/1
40 CAPSULE, EXTENDED RELEASE ORAL EVERY MORNING
Qty: 60 CAPSULE | Refills: 0 | Status: SHIPPED | OUTPATIENT
Start: 2020-11-20 | End: 2020-12-24 | Stop reason: SDUPTHER

## 2020-11-20 RX ORDER — NAPROXEN 500 MG/1
500 TABLET ORAL 2 TIMES DAILY WITH MEALS
Qty: 60 TABLET | Refills: 6 | OUTPATIENT
Start: 2020-11-20

## 2020-11-23 RX ORDER — LORATADINE 10 MG/1
10 TABLET ORAL DAILY
Qty: 30 TABLET | Refills: 4 | Status: SHIPPED | OUTPATIENT
Start: 2020-11-23 | End: 2021-07-01

## 2020-11-23 RX ORDER — NAPROXEN 500 MG/1
500 TABLET ORAL 2 TIMES DAILY WITH MEALS
Qty: 60 TABLET | Refills: 6 | OUTPATIENT
Start: 2020-11-23

## 2020-11-23 NOTE — TELEPHONE ENCOUNTER
Request for loratadine - med pended. Please fill if appropriate. Next Visit Date:  No future appointments.     Health Maintenance   Topic Date Due    Flu vaccine (1) 09/01/2020    Chlamydia screen  10/01/2020    DTaP/Tdap/Td vaccine (7 - Td) 08/12/2021    Hepatitis A vaccine  Completed    Hepatitis B vaccine  Completed    Hib vaccine  Completed    HPV vaccine  Completed    Varicella vaccine  Completed    Meningococcal (ACWY) vaccine  Completed    HIV screen  Completed    Pneumococcal 0-64 years Vaccine  Aged Out       Hemoglobin A1C (%)   Date Value   09/13/2019 4.9             ( goal A1C is < 7)   No results found for: LABMICR  LDL Cholesterol (mg/dL)   Date Value   09/13/2019 107       (goal LDL is <100)   ALT (U/L)   Date Value   10/07/2020 10     BP Readings from Last 3 Encounters:   08/17/20 121/82   02/28/20 (!) 137/100   02/27/20 120/79          (goal 120/80)    All Future Testing planned in [facility]        Patient Active Problem List:     ADHD (attention deficit hyperactivity disorder)     Asthma     Pes planovalgus, acquired     Astigmatism     Seasonal allergies     Morbidly obese (HCC)     Acne vulgaris     Congenital tarsal coalition     Eczema     IUD strings lost     IUD (intrauterine device) in place     Left lower quadrant abdominal pain     Ovarian cyst

## 2020-12-23 NOTE — TELEPHONE ENCOUNTER
Request for adderall.       Next Visit Date: last OV: 8/17/20  Future Appointments   Date Time Provider Suyapa Thomas   12/31/2020  2:30 PM STV MRI RM 1 [facility] MRI [facility]iolog   12/31/2020  3:15 PM STV MRI RM 1 STVZ MRI STV Radiolog       Health Maintenance   Topic Date Due    Hepatitis C screen  2000    Flu vaccine (1) 09/01/2020    Chlamydia screen  10/01/2020    Pneumococcal 0-64 years Vaccine (1 of 1 - PPSV23) 08/17/2021 (Originally 5/3/2006)    DTaP/Tdap/Td vaccine (7 - Td) 08/12/2021    Hepatitis A vaccine  Completed    Hepatitis B vaccine  Completed    Hib vaccine  Completed    HPV vaccine  Completed    Varicella vaccine  Completed    Meningococcal (ACWY) vaccine  Completed    HIV screen  Completed       Hemoglobin A1C (%)   Date Value   09/13/2019 4.9             ( goal A1C is < 7)   No results found for: LABMICR  LDL Cholesterol (mg/dL)   Date Value   09/13/2019 107       (goal LDL is <100)   ALT (U/L)   Date Value   10/07/2020 10     BP Readings from Last 3 Encounters:   08/17/20 121/82   02/28/20 (!) 137/100   02/27/20 120/79          (goal 120/80)    All Future Testing planned in [facility]        Patient Active Problem List:     ADHD (attention deficit hyperactivity disorder)     Asthma     Pes planovalgus, acquired     Astigmatism     Seasonal allergies     Morbidly obese (HCC)     Acne vulgaris     Congenital tarsal coalition     Eczema     IUD strings lost     IUD (intrauterine device) in place     Left lower quadrant abdominal pain     Ovarian cyst

## 2020-12-24 RX ORDER — DEXTROAMPHETAMINE SACCHARATE, AMPHETAMINE ASPARTATE MONOHYDRATE, DEXTROAMPHETAMINE SULFATE AND AMPHETAMINE SULFATE 5; 5; 5; 5 MG/1; MG/1; MG/1; MG/1
40 CAPSULE, EXTENDED RELEASE ORAL EVERY MORNING
Qty: 60 CAPSULE | Refills: 0 | Status: SHIPPED | OUTPATIENT
Start: 2020-12-24 | End: 2021-10-04 | Stop reason: SDUPTHER

## 2021-03-23 ENCOUNTER — HOSPITAL ENCOUNTER (OUTPATIENT)
Age: 21
Setting detail: SPECIMEN
Discharge: HOME | End: 2021-03-23
Payer: COMMERCIAL

## 2021-03-23 ENCOUNTER — OFFICE VISIT (OUTPATIENT)
Dept: OBGYN | Age: 21
End: 2021-03-23
Payer: COMMERCIAL

## 2021-03-23 VITALS
WEIGHT: 232.6 LBS | SYSTOLIC BLOOD PRESSURE: 139 MMHG | BODY MASS INDEX: 39.31 KG/M2 | HEART RATE: 73 BPM | DIASTOLIC BLOOD PRESSURE: 87 MMHG | TEMPERATURE: 97 F

## 2021-03-23 DIAGNOSIS — R10.9 LEFT FLANK PAIN: ICD-10-CM

## 2021-03-23 DIAGNOSIS — R10.2 ADNEXAL PAIN: Primary | ICD-10-CM

## 2021-03-23 DIAGNOSIS — R10.32 LEFT GROIN PAIN: ICD-10-CM

## 2021-03-23 DIAGNOSIS — R10.2 PELVIC PAIN: ICD-10-CM

## 2021-03-23 LAB
-: ABNORMAL
AMORPHOUS: ABNORMAL
BACTERIA: ABNORMAL
BILIRUBIN URINE: NEGATIVE
CASTS UA: ABNORMAL /LPF (ref 0–8)
COLOR: YELLOW
CRYSTALS, UA: ABNORMAL /HPF
EPITHELIAL CELLS UA: ABNORMAL /HPF (ref 0–5)
GLUCOSE URINE: NEGATIVE
KETONES, URINE: NEGATIVE
LEUKOCYTE ESTERASE, URINE: NEGATIVE
MUCUS: ABNORMAL
NITRITE, URINE: NEGATIVE
OTHER OBSERVATIONS UA: ABNORMAL
PH UA: 5.5 (ref 5–8)
PROTEIN UA: ABNORMAL
RBC UA: ABNORMAL /HPF (ref 0–4)
RENAL EPITHELIAL, UA: ABNORMAL /HPF
SPECIFIC GRAVITY UA: 1.02 (ref 1–1.03)
TRICHOMONAS: ABNORMAL
TURBIDITY: CLEAR
URINE HGB: ABNORMAL
UROBILINOGEN, URINE: NORMAL
WBC UA: ABNORMAL /HPF (ref 0–5)
YEAST: ABNORMAL

## 2021-03-23 PROCEDURE — 99212 OFFICE O/P EST SF 10 MIN: CPT | Performed by: STUDENT IN AN ORGANIZED HEALTH CARE EDUCATION/TRAINING PROGRAM

## 2021-03-23 RX ORDER — DEXTROAMPHETAMINE SACCHARATE, AMPHETAMINE ASPARTATE MONOHYDRATE, DEXTROAMPHETAMINE SULFATE AND AMPHETAMINE SULFATE 5; 5; 5; 5 MG/1; MG/1; MG/1; MG/1
20 CAPSULE, EXTENDED RELEASE ORAL 2 TIMES DAILY
COMMUNITY
Start: 2021-03-09

## 2021-03-23 RX ORDER — CYCLOBENZAPRINE HCL 10 MG
10 TABLET ORAL 3 TIMES DAILY PRN
Qty: 21 TABLET | Refills: 0 | Status: SHIPPED | OUTPATIENT
Start: 2021-03-23 | End: 2021-04-02

## 2021-03-23 RX ORDER — GLYCOPYRROLATE 1 MG/1
1 TABLET ORAL 3 TIMES DAILY
COMMUNITY
Start: 2021-01-20

## 2021-03-23 ASSESSMENT — PATIENT HEALTH QUESTIONNAIRE - PHQ9
SUM OF ALL RESPONSES TO PHQ QUESTIONS 1-9: 0
SUM OF ALL RESPONSES TO PHQ QUESTIONS 1-9: 0
SUM OF ALL RESPONSES TO PHQ9 QUESTIONS 1 & 2: 0

## 2021-03-23 NOTE — PROGRESS NOTES
OB/GYN Problem Visit    [facility]  3/23/2021                       Primary Care Physician: Cassandra Maloney MD    CC:   Chief Complaint   Patient presents with    Follow-up     Pelvic pain          HPI: Cintia Lovett is a 21 y.o. female     The patient was seen and examined. She is here for left sided adnexal pain since 2019. It occurs randomly once a week. It is sometimes after showers and sometimes worse with activity. She describes it as sharp and constant, lasting all day. Sometimes radiates up her left side. She has tried 800 mg Ibuprofen and Naproxen which does not help. Denies N/V, hematuria, or dysuria. Denies constipation/diarrhea. Patient had a Mirena IUD placed by Dr. Madi Florez in 2019. Last TVUS on 20 showed correct IUD position. The left ovary was not visualized on that exam. She also reports history of a \"really bad UTI when I was younger that injured my kidney. \" She does not follow with Urology/Nephrology. Her No LMP recorded. Patient has had an implant. and she denies irregular/heavy bleeding and dysmenorrhea. She does not have periods on the IUD and wishes to continue use.        REVIEW OF SYSTEMS:   Constitutional: negative fever, negative chills  HEENT: negative visual disturbances, negative headaches  Respiratory: negative dyspnea, negative cough  Cardiovascular: negative chest pain,  negative palpitations  Gastrointestinal: negative abdominal pain, negative RUQ pain, negative N/V, negative diarrhea, negative constipation  Genitourinary: negative dysuria, negative vaginal discharge  Dermatological: negative rash, negative wounds  Hematologic: negative bleeding/clotting disorder  Immunologic: negative recent illness, negative recent sick contact, negative allergic reactions  Lymphatic: negative lymph nodes  Musculoskeletal: negative back pain, negative myalgias, negative arthralgias  Neurological:  negative dizziness, negative weakness Patient is requesting a refill of the selected medications as soon as possible. Patient stated that he is out. Preferred pharmacy has been selected. Patient is requesting a few weeks supply of the selected medication. Patient is requesting Dr. Hernandez to contact [facility] to have the patient use them in the future.    Patient Name: Mark Gray  Caller Name: Mark  Callback Number: [phone]  Best Availability: any  Did you confirm the message with the caller?: yes    Thank you,  Fareed Ruiz     Behavior/Psych: negative depression, negative anxiety    ________________________________________________________________________    GYNECOLOGICAL HISTORY:  Age of Menarche: 5  Age of Menopause: N/A      Sexually Active: not sexually active  STD History: no past history    Pap History: Patient has never had a Pap test.    Permanent Sterilization: no  Reversible Birth Control: yes - Mirena IUD  Hormone Replacement Exposure: no    OBSTETRICAL HISTORY:  OB History    Para Term  AB Living   0 0 0 0 0 0   SAB TAB Ectopic Molar Multiple Live Births   0 0 0 0 0 0       PAST MEDICAL HISTORY:      Diagnosis Date    Acute pyelonephritis  - 2004    ; normal cystogram and vcug     ADHD (attention deficit hyperactivity disorder) 2008    Allergic rhinitis 2010    Asthma 2011    abnormal PFT    Astigmatism     Obesity 9/3/2014    Pes planovalgus, acquired 2010    referred to ortho    Vesicoureteral reflux 04    normal cysto 06, and normal vcug 06. PAST SURGICAL HISTORY:                                                                    Procedure Laterality Date    FOOT SURGERY Left        MEDICATIONS:  Current Outpatient Medications   Medication Sig Dispense Refill    glycopyrrolate (ROBINUL) 1 MG tablet Take 1 mg by mouth 3 times daily      amphetamine-dextroamphetamine (ADDERALL XR) 20 MG extended release capsule Take 20 mg by mouth 2 times daily.       cyclobenzaprine (FLEXERIL) 10 MG tablet Take 1 tablet by mouth 3 times daily as needed for Muscle spasms 21 tablet 0    loratadine (CLARITIN) 10 MG tablet Take 1 tablet by mouth daily 30 tablet 4    naproxen (NAPROSYN) 500 MG tablet Take 1 tablet by mouth 2 times daily (with meals) 60 tablet 6    hydrocortisone 1 % cream       albuterol (PROVENTIL) (2.5 MG/3ML) 0.083% nebulizer solution Take 3 mLs by nebulization every 6 hours as needed for Wheezing 120 each 4    albuterol sulfate  (90 Base) MCG/ACT inhaler Inhale 2 puffs into the lungs every 4 hours as needed for Wheezing 1 Inhaler 3    amphetamine-dextroamphetamine (ADDERALL XR) 20 MG extended release capsule Take 2 capsules by mouth every morning for 30 days. 60 capsule 0     No current facility-administered medications for this visit. ALLERGIES:  Allergies as of 03/23/2021    (No Known Allergies)                                   VITALS:  Vitals:    03/23/21 1325 03/23/21 1329   BP: (!) 140/88 139/87   Site: Left Lower Arm Right Lower Arm   Position: Sitting Sitting   Cuff Size: Large Adult Large Adult   Pulse: 73 73   Temp: 97 °F (36.1 °C)    Weight: 232 lb 9.6 oz (105.5 kg)                                                                                                                                                                            PHYSICAL EXAM:   Chaperone for Intimate Exam: Chaperone was present for entire exam, Chaperone Name: Tom, [place]    PHYSICAL EXAM:     General Appearance: Appears healthy. Alert; in no acute distress. Pleasant. Skin: Normal  Lymphatic: No cervical, superclavicular, axillary, or inguinal adenopathy. HEENT: normocephalic and atraumatic, Thyroid normal to inspection and palpation  Respiratory: clear to auscultation, no wheezes, rales or rhonchi, symmetric air entry  Cardiovascular: normal, regular rate and rhythm, no murmurs, rubs, or gallops  Breast:  (Chest): not done  Abdomen: soft, non-tender, non-distended, no right upper quadrant tenderness and no CVA tenderness, no abdominal scars  Rectal Exam: deferred  Extremities: non-tender BLE and non-edematous  Musculoskeletal: no gross abnormalities  Psych: Alert and oriented, appropriate affect.   Pelvic Exam:   External genitalia: General appearance; normal, Hair distribution; normal, Lesions absent  Urinary system: urethral meatus normal and bladder not palpable  Vaginal: normal mucosa, no discharge  Cervix: normal appearing cervix without discharge or lesions  Adnexa: normal adnexa in size, nontender and no masses  Uterus: normal single, nontender and mid-position    DATA:  No results found for this visit on 21. ASSESSMENT & PLAN:    Gretchen Mcgraw is a 21 y.o. female  here with left adnexal pain    - Afebrile, VSS    - GC/C and vaginitis probe collected    - TVUS ordered, left ovary not visualized on prior exam    - Renal US ordered to rule out kidney stone     - UA micro ordered    - Follow up in 4 weeks for virtual visit to discuss results     Dysmenorrhea    - Mirena IUD in place, strings not visualized on today's exam    - TVUS in 2020 showed correct position of IUD    - Repeat TVUS ordered       Patient Active Problem List    Diagnosis Date Noted    Left lower quadrant abdominal pain 2020    Ovarian cyst 2020     Simple 2.0 x 2.4 x 1.4 cm left ovarian cyst which requires no further follow up            IUD (intrauterine device) in place 2019    IUD strings lost 2019     TVUS 19 IUD in correct place near fundus      Eczema 2016    Congenital tarsal coalition 2016    Acne vulgaris 2015    Morbidly obese ([facility] [address] 2014    Seasonal allergies 2012    Astigmatism     Pes planovalgus, acquired      Left.  ADHD (attention deficit hyperactivity disorder) 2008    Asthma 2006       Return in about 4 weeks (around 2021) for virtual follow up resultes . Counseling Completed:  discussed birth control and barrier recommendations. discussed STD counseling and prevention. discussed Gardisil counseling for all patients 10-37 yo. Hereditary Breast, Ovarian, Colon and Uterine Cancer screening discussed. Tobacco & Secondary smoke risks discussed; with recommendation for cessation and avoidance. Routine health maintenance per patients PCP discussed. Patient was seen with total face to face time of 15 minutes.  More than 50% of this visit was on counseling and education regarding her diagnose(s) as listed below and options. She was also counseled on her preventative health maintenance recommendations and follow-up. Diagnosis Orders   1. Adnexal pain  US PELVIS COMPLETE    US NON OB TRANSVAGINAL    cyclobenzaprine (FLEXERIL) 10 MG tablet   2. Left groin pain  US RETROPERITONEAL COMPLETE   3. Left flank pain  Urinalysis With Microscopic    US RETROPERITONEAL COMPLETE   4. Pelvic pain  Chlamydia Trachomatis & Neisseria gonorrhoeae (GC) by amplified detection    Vaginitis DNA Probe    US PELVIS COMPLETE    US NON OB TRANSVAGINAL       Vinicius Torrez DO  Ob/Gyn Resident  [facility] [place]  3/23/2021, 2:17 PM     Date: 3/23/2021  Time: 4:10 PM      Patient Name: Nancy Gutiérrez  Patient : 2000  Room/Bed: Room/bed info not found  Admission Date/Time: No admission date for patient encounter. Attending Physician Statement  I have discussed the care of Nancy Gutiérrez, including pertinent history and exam findings with the resident. I have reviewed and edited their note in the electronic medical record. The key elements of all parts of the encounter have been performed/reviewed by me . I agree with the assessment, plan and orders as documented by the resident. The level of care submitted represents to the best of my ability the care documented in the medical record today. GE Modifier. This service has been performed in part by a resident under the direction of a teaching physician.         Attending's Name:  Isaac Hilliard DO

## 2021-03-24 DIAGNOSIS — R10.2 PELVIC PAIN: ICD-10-CM

## 2021-03-24 LAB
DIRECT EXAM: NORMAL
Lab: NORMAL
SPECIMEN DESCRIPTION: NORMAL

## 2021-03-31 DIAGNOSIS — R10.2 PELVIC PAIN IN FEMALE: Primary | ICD-10-CM

## 2021-04-07 ENCOUNTER — HOSPITAL ENCOUNTER (OUTPATIENT)
Dept: ULTRASOUND IMAGING | Facility: CLINIC | Age: 21
Discharge: HOME | End: 2021-04-09
Payer: COMMERCIAL

## 2021-04-07 DIAGNOSIS — R10.9 LEFT FLANK PAIN: ICD-10-CM

## 2021-04-07 DIAGNOSIS — R10.32 LEFT GROIN PAIN: ICD-10-CM

## 2021-04-07 DIAGNOSIS — R10.2 PELVIC PAIN IN FEMALE: ICD-10-CM

## 2021-04-07 PROCEDURE — 76856 US EXAM PELVIC COMPLETE: CPT

## 2021-04-07 PROCEDURE — 76775 US EXAM ABDO BACK WALL LIM: CPT

## 2021-04-28 ENCOUNTER — VIRTUAL VISIT (OUTPATIENT)
Dept: OBGYN | Age: 21
End: 2021-04-28
Payer: COMMERCIAL

## 2021-04-28 DIAGNOSIS — R10.9 LEFT SIDED ABDOMINAL PAIN: ICD-10-CM

## 2021-04-28 DIAGNOSIS — R31.29 OTHER MICROSCOPIC HEMATURIA: Primary | ICD-10-CM

## 2021-04-28 DIAGNOSIS — T83.32XD INTRAUTERINE CONTRACEPTIVE DEVICE THREADS LOST, SUBSEQUENT ENCOUNTER: ICD-10-CM

## 2021-04-28 PROBLEM — N83.209 OVARIAN CYST: Status: RESOLVED | Noted: 2020-01-30 | Resolved: 2021-04-28

## 2021-04-28 PROCEDURE — 99442 PR PHYS/QHP TELEPHONE EVALUATION 11-20 MIN: CPT | Performed by: STUDENT IN AN ORGANIZED HEALTH CARE EDUCATION/TRAINING PROGRAM

## 2021-04-28 RX ORDER — CYCLOBENZAPRINE HCL 10 MG
10 TABLET ORAL 3 TIMES DAILY PRN
Qty: 21 TABLET | Refills: 0 | Status: SHIPPED | OUTPATIENT
Start: 2021-04-28 | End: 2021-05-08

## 2021-04-28 NOTE — PROGRESS NOTES
Loyd Burden is a 21 y.o. female evaluated via telephone on 4/28/2021. Consent:  She and/or health care decision maker is aware that that she may receive a bill for this telephone service, depending on her insurance coverage, and has provided verbal consent to proceed: Yes      Documentation:  I communicated with the patient and/or health care decision maker about follow for left sided abdominal pain. Details of this discussion including any medical advice provided:     Patient reports left sided pain is the same. She reports the Flexeril 'calmed it down', but nothing makes it completely better. She reports Motrin/Aleve do not help. She denies any known aggravating factors and reports it comes on at random. TVUS showed IUD properly placed in the endometrium. Left ovary was normal without cysts. UA showed large Hgb and 20-50 RBCs. She denied vaginal bleeding at time of urine sample collection. Renal US unremarkable. No definitive gyn cause for left sided abdominal pain. Will place referral to Urology for further evaluation of hematuria. Discussed with Dr. Dianne Vásquez who is agreeable. Continue supportive management with Motrin/Flexeril prn. Recommend heating pad/warm bath as well. Follow up in 8 weeks or sooner as needed. I affirm this is a Patient Initiated Episode with a Patient who has not had a related appointment within my department in the past 7 days or scheduled within the next 24 hours. Patient identification was verified at the start of the visit: Yes    Total Time: minutes: 11-20 minutes    The visit was conducted pursuant to the emergency declaration under the [address] [place] [address], [address] [place] [address] authority and the [employer] [place] Act. Patient identification was verified, and a caregiver was present when appropriate. The patient was located in a state where the provider was credentialed to provide care.

## 2021-05-20 ENCOUNTER — OFFICE VISIT (OUTPATIENT)
Dept: UROLOGY | Age: 21
End: 2021-05-20
Payer: COMMERCIAL

## 2021-05-20 VITALS
BODY MASS INDEX: 38.99 KG/M2 | SYSTOLIC BLOOD PRESSURE: 136 MMHG | HEIGHT: 65 IN | HEART RATE: 90 BPM | WEIGHT: 234 LBS | DIASTOLIC BLOOD PRESSURE: 76 MMHG

## 2021-05-20 DIAGNOSIS — R31.29 HEMATURIA, MICROSCOPIC: Primary | ICD-10-CM

## 2021-05-20 LAB
APPEARANCE FLUID: NORMAL
BILIRUBIN, POC: NORMAL
BLOOD URINE, POC: NORMAL
CLARITY, POC: CLEAR
COLOR, POC: YELLOW
GLUCOSE URINE, POC: NORMAL
KETONES, POC: NORMAL
LEUKOCYTE EST, POC: NORMAL
NITRITE, POC: NORMAL
PH, POC: 7
PROTEIN, POC: NORMAL
SPECIFIC GRAVITY, POC: 1.02
UROBILINOGEN, POC: 0.2

## 2021-05-20 PROCEDURE — 99202 OFFICE O/P NEW SF 15 MIN: CPT

## 2021-05-20 PROCEDURE — 81002 URINALYSIS NONAUTO W/O SCOPE: CPT | Performed by: SPECIALIST

## 2021-05-20 PROCEDURE — G8427 DOCREV CUR MEDS BY ELIG CLIN: HCPCS | Performed by: SPECIALIST

## 2021-05-20 PROCEDURE — 99205 OFFICE O/P NEW HI 60 MIN: CPT | Performed by: SPECIALIST

## 2021-05-20 PROCEDURE — G8417 CALC BMI ABV UP PARAM F/U: HCPCS | Performed by: SPECIALIST

## 2021-05-20 NOTE — LETTER
[facility]    5/20/21    Patient: Yael Nolasco  YOB: 2000    Dear Aurelia Gomez MD,    I had the pleasure of seeing one of your patients, Toni Hayes today in the office today. Below are the relevant portions of my assessment and plan of care. IMPRESSION:  1. Hematuria, microscopic      PLAN:  The patient presents with large microhematuria and some blood with wiping. Her 4/7/21 renal U/S was normal.  She will need to have a Cystoscopy to evaluate lower urinary tract under MAC. Thank you for allowing me to participate in the care of this patient. I will keep you updated on this patient's follow up and I look forward to serving you and your patients again in the future. Kortney Braag MD, FACS

## 2021-05-20 NOTE — PROGRESS NOTES
Kortney Braga MD, [facility]  [facility] Consultation Office Visit  Patient:  Yael Nolasco  YOB: 2000  Date: 5/20/2021  Consult requested from [facility]  for purpose of evaluation of Microhematuria. HISTORY OF PRESENT ILLNESS:   The patient is a 24 y.o. female who presents today for evaluation of the following problems:   MicroHematuria:  Patient is here today with a history microscopic hematuria which was first noted several month(s) ago. On today's Urinalysis the blood is unchanged.     Lower urinary tract symptoms: urgency and frequency  Associated Symptoms: No dysuria, gross hematuria but has noticed blood with wiping  Pain Severity: Pain Score:   0 - No pain/10    (Patient's old records have been requested, reviewed and summarized in today's note: 4/28/21 office note of Maria Teresa Mercedes and Megan Oconnor MD)    Summary of old records:   Microhematuria, large with blood with wiping; 4/7/21 renal U/S normal; needs cysto under MAC    - No gross hematuria  - urinalysis reviewed and shows large blood  - She notes blood when she wipes urethra after voiding  - No blood thinners  - Denies smoking    Procedures Today: N/A    Urinalysis today:  Results for POC orders placed in visit on 05/20/21   POCT Urine Dipstick   Result Value Ref Range    Color, UA yellow     Clarity, UA clear     Glucose, UA POC neg     Bilirubin, UA neg     Ketones, UA neg     Spec Grav, UA 1.025     Blood, UA POC large     pH, UA 7.0     Protein, UA POC >=300 mg     Urobilinogen, UA 0.2     Leukocytes, UA trace     Nitrite, UA neg     Appearance, Fluid         Today's AUA Symptom Score (QOL): 8 (3)    Last BUN and creatinine:  No results found for: BUN  Lab Results   Component Value Date    CREATININE 0.57 10/07/2020       Additional Lab/Culture results: 3/23/21 UA large blood    Imaging Reviewed during this Office Visit: none  (results were independently reviewed by physician and radiology report verified)    Renal ultrasound (4/7/21)  The right kidney measures 11.9 cm in length and the left kidney measures 11.6   cm in length.       Kidneys demonstrate normal cortical echogenicity.  No hydronephrosis or   intrarenal stones.  No focal lesions. PAST MEDICAL, FAMILY AND SOCIAL HISTORY:  Past Medical History:   Diagnosis Date    Acute pyelonephritis 11/8 - 11/11/2004    ; normal cystogram and vcug 2006    ADHD (attention deficit hyperactivity disorder) 5/2008    Allergic rhinitis 8/2010    Asthma 8/2011    abnormal PFT    Astigmatism     Obesity 9/3/2014    Pes planovalgus, acquired 12/2010    referred to ortho    Vesicoureteral reflux 11/9/04    normal cysto 11/2/06, and normal vcug 5/19/06. Past Surgical History:   Procedure Laterality Date    FOOT SURGERY Left      Family History   Problem Relation Age of Onset    Allergies Mother     Obesity Father     Allergies Brother     Other Brother         learning problems    Asthma Brother     Arthritis Neg Hx     Birth Defects Neg Hx     Cancer Neg Hx     Depression Neg Hx     Diabetes Neg Hx     Early Death Neg Hx     Hearing Loss Neg Hx     Heart Disease Neg Hx     High Blood Pressure Neg Hx     High Cholesterol Neg Hx     Kidney Disease Neg Hx     Learning Disabilities Neg Hx     Mental Illness Neg Hx     Mental Retardation Neg Hx     Miscarriages / Stillbirths Neg Hx     Stroke Neg Hx     Substance Abuse Neg Hx     Vision Loss Neg Hx     Breast Cancer Neg Hx     Uterine Cancer Neg Hx     Colon Cancer Neg Hx     Ovarian Cancer Neg Hx      Current Outpatient Medications   Medication Sig Dispense Refill    glycopyrrolate (ROBINUL) 1 MG tablet Take 1 mg by mouth 3 times daily      amphetamine-dextroamphetamine (ADDERALL XR) 20 MG extended release capsule Take 20 mg by mouth 2 times daily.       loratadine (CLARITIN) 10 MG tablet Take 1 tablet by mouth daily 30 tablet 4    naproxen (NAPROSYN) 500 MG tablet Take 1 tablet by mouth 2 times daily (with meals) 60 tablet 6    hydrocortisone 1 % cream       albuterol (PROVENTIL) (2.5 MG/3ML) 0.083% nebulizer solution Take 3 mLs by nebulization every 6 hours as needed for Wheezing 120 each 4    albuterol sulfate  (90 Base) MCG/ACT inhaler Inhale 2 puffs into the lungs every 4 hours as needed for Wheezing 1 Inhaler 3    amphetamine-dextroamphetamine (ADDERALL XR) 20 MG extended release capsule Take 2 capsules by mouth every morning for 30 days. 60 capsule 0     No current facility-administered medications for this visit. Patient has no known allergies. Social History     Tobacco Use   Smoking Status Never Smoker   Smokeless Tobacco Never Used      (If patient a smoker, smoking cessation counseling offered)   Social History     Substance and Sexual Activity   Alcohol Use No       REVIEW OF SYSTEMS (obtained by ancillary medical staff):  Level 2  Constitutional: negative  Level 3  Respiratory: negative  Level 4-5  Eyes: negative  Neurological: negative  Gastrointestinal: negative  Cardiovascular: negative  Skin: negative   Musculoskeletal: negative  Ears/Nose/Throat: negative  Psychological: negative    Physical Exam:    This a 24 y.o. female      Vitals:    05/20/21 0919   BP: 136/76   Pulse: 90     Body mass index is 39.55 kg/m². Constitutional: Patient in no acute distress. Neuro: Alert and oriented to person, place and time. Psych: mood and affect normal  HEENT no scleral icterus  Lungs: Respiratory effort is normal  Cardiovascular: Normal peripheral pulses  Abdomen: Soft, non-tender, non-distended with no CVA, flank pain or hepatosplenomegaly. No hernias. Kidneys normal.  Lymphatics: No palpable lymphadenopathy. Bladder non-tender and not distended. Pelvic exam:deferred until cysto      Assessment and Plan      1. Hematuria, microscopic           Plan:      The patient presents with large microhematuria and some blood with wiping.   Her 4/7/21 renal

## 2021-07-01 ENCOUNTER — OFFICE VISIT (OUTPATIENT)
Dept: DERMATOLOGY | Age: 21
End: 2021-07-01
Payer: COMMERCIAL

## 2021-07-01 VITALS
HEART RATE: 63 BPM | BODY MASS INDEX: 40.89 KG/M2 | OXYGEN SATURATION: 100 % | DIASTOLIC BLOOD PRESSURE: 76 MMHG | TEMPERATURE: 97.9 F | HEIGHT: 63 IN | SYSTOLIC BLOOD PRESSURE: 118 MMHG | WEIGHT: 230.8 LBS

## 2021-07-01 DIAGNOSIS — R61 HYPERHIDROSIS: Primary | ICD-10-CM

## 2021-07-01 PROCEDURE — 99204 OFFICE O/P NEW MOD 45 MIN: CPT | Performed by: DERMATOLOGY

## 2021-07-01 PROCEDURE — G8427 DOCREV CUR MEDS BY ELIG CLIN: HCPCS | Performed by: DERMATOLOGY

## 2021-07-01 PROCEDURE — G8417 CALC BMI ABV UP PARAM F/U: HCPCS | Performed by: DERMATOLOGY

## 2021-07-01 PROCEDURE — 1036F TOBACCO NON-USER: CPT | Performed by: DERMATOLOGY

## 2021-07-01 RX ORDER — GLYCOPYRRONIUM 2.4 G/100G
CLOTH TOPICAL
Qty: 60 EACH | Refills: 2 | Status: SHIPPED | OUTPATIENT
Start: 2021-07-01

## 2021-07-01 RX ORDER — OXYBUTYNIN CHLORIDE 10 MG/1
10 TABLET, EXTENDED RELEASE ORAL DAILY
Qty: 30 TABLET | Refills: 3 | Status: SHIPPED | OUTPATIENT
Start: 2021-07-01

## 2021-07-01 NOTE — Clinical Note
Botox for botox for axillary hyperhidrosis. 50 units per axilla (100 total) q 3 months.  CPT code 18634

## 2021-07-01 NOTE — PROGRESS NOTES
Dermatology Patient Note  [facility]  [address]  [address] [place] [address]  Dept: [phone]  Dept Fax: [phone]      VISITDATE: 7/1/2021   REFERRING PROVIDER: No ref. provider found      Ria Fletcher is a 24 y.o. female  who presents today in the office for:    New Patient (hyperhidrosis-whole body; tried a pill that made her mouth dry & deodorant with no improvement)      HISTORY OF PRESENT ILLNESS:  Patient presents for hyperhidrosis, mostly in axilla and groin  -glycopyrrolate didn't help and had unacceptable side effects  - tried drysol with no improvement  - currently using antiperspirants in the morning  - soaks her clothing and has to change her underwear 3-4 times daily    MEDICAL PROBLEMS:  Patient Active Problem List    Diagnosis Date Noted    Left lower quadrant abdominal pain 01/30/2020    IUD (intrauterine device) in place 06/13/2019    IUD strings lost 06/12/2019     TVUS 6/12/19 IUD in correct place near fundus  TVUS 4/7/21:   IUD is in   satisfactory position within the endometrial body/fundus.  Eczema 03/29/2016    Congenital tarsal coalition 02/02/2016    Acne vulgaris 12/29/2015    Morbidly obese (HCC) 09/03/2014    Seasonal allergies 06/28/2012    Astigmatism     Pes planovalgus, acquired      Left.  ADHD (attention deficit hyperactivity disorder) 05/01/2008    Asthma 11/28/2006       CURRENT MEDICATIONS:   Current Outpatient Medications   Medication Sig Dispense Refill    oxybutynin (DITROPAN XL) 10 MG extended release tablet Take 1 tablet by mouth daily 30 tablet 3    Glycopyrronium Tosylate (QBREXZA) 2.4 % PADS Apply to affected areas daily 60 each 2    glycopyrrolate (ROBINUL) 1 MG tablet Take 1 mg by mouth 3 times daily      amphetamine-dextroamphetamine (ADDERALL XR) 20 MG extended release capsule Take 20 mg by mouth 2 times daily.       naproxen (NAPROSYN) 500 MG tablet Take 1 tablet by mouth 2 times daily (with meals) 60 tablet 6    albuterol (PROVENTIL) (2.5 MG/3ML) 0.083% nebulizer solution Take 3 mLs by nebulization every 6 hours as needed for Wheezing 120 each 4    albuterol sulfate  (90 Base) MCG/ACT inhaler Inhale 2 puffs into the lungs every 4 hours as needed for Wheezing 1 Inhaler 3    amphetamine-dextroamphetamine (ADDERALL XR) 20 MG extended release capsule Take 2 capsules by mouth every morning for 30 days. 60 capsule 0     No current facility-administered medications for this visit. ALLERGIES:   No Known Allergies    SOCIAL HISTORY:  Social History     Tobacco Use    Smoking status: Never Smoker    Smokeless tobacco: Never Used   Substance Use Topics    Alcohol use: No       Pertinent ROS:  Review of Systems  Skin: Denies any new changing, growing or bleeding lesions or rashes except as described in the HPI   Constitutional: Denies fevers, chills, and malaise. PHYSICAL EXAM:   /76 (Site: Right Upper Arm, Position: Sitting, Cuff Size: Medium Adult)   Pulse 63   Temp 97.9 °F (36.6 °C) (Tympanic)   Ht 5' 3\" (1.6 m)   Wt 230 lb 12.8 oz (104.7 kg)   SpO2 100%   BMI 40.88 kg/m²     The patient is generally well appearing, well nourished, alert and conversational. Affect is normal.    Cutaneous Exam:  Physical Exam  Sun-exposed skin: head/face, neck, both arms, digits and nails were examined. Facial covering was not removed during examination. Diagnoses/exam findings/medical history pertinent to this visit are listed below:    Assessment and Plan:  Assessment   1.  Hyperhidrosis, diffuse but mainly groin and axillae  - discussed diagnosis, etiology, natural course, and treatment options  - chronic illness with progression and/or exacerbation   - consider discussion dosing of adderrall with prescriber - may be contributing  - use antiperspirants on axillae at night  - switch glycopyrrolate to oxybutynin  - trial of qbrexa wipes for groin  - PA for botox of bilateral axillae  - oxybutynin (DITROPAN XL) 10 MG extended release tablet; Take 1 tablet by mouth daily  Dispense: 30 tablet; Refill: 3  - Glycopyrronium Tosylate (QBREXZA) 2.4 % PADS; Apply to affected areas daily  Dispense: 60 each; Refill: 2          RTC 3 months  Future Appointments   Date Time Provider Suyapa Thomas   10/4/2021 11:00 AM Rao Barcenas MD [id number]         Patient Instructions   - START OXYBUTYNIN  - START APPLYING QBREXA WIPES IN THE UNDERARMS AND GROIN AREA  - PRIOR AUTHORIZATION FOR BOTOX  - FOLLOW UP IN THE OFFICE IN 3 MONTHS        This note was created with the assistance of a speech-recognition program.  Although the intention is to generate a document that actually reflects the content of the visit, no guarantees can be provided that every mistake has been identified and corrected byediting.     Electronically signed by Rao Barcenas MD on 7/1/21 at 11:28 AM EDT

## 2021-07-07 ENCOUNTER — HOSPITAL ENCOUNTER (EMERGENCY)
Age: 21
Discharge: HOME | End: 2021-07-07
Attending: EMERGENCY MEDICINE
Payer: COMMERCIAL

## 2021-07-07 VITALS
RESPIRATION RATE: 14 BRPM | OXYGEN SATURATION: 98 % | DIASTOLIC BLOOD PRESSURE: 87 MMHG | HEIGHT: 63 IN | WEIGHT: 230 LBS | HEART RATE: 91 BPM | BODY MASS INDEX: 40.75 KG/M2 | TEMPERATURE: 97.7 F | SYSTOLIC BLOOD PRESSURE: 142 MMHG

## 2021-07-07 DIAGNOSIS — R04.0 EPISTAXIS: Primary | ICD-10-CM

## 2021-07-07 NOTE — ED NOTES
Pt presents to the er c/o a nose bleed for the past three days pt states that the bleeding has been increasing every day.  Pt nose is not currently bleeding at this time     Kay Aragon RN  07/07/21 1242

## 2021-07-08 ENCOUNTER — TELEPHONE (OUTPATIENT)
Dept: DERMATOLOGY | Age: 21
End: 2021-07-08

## 2021-07-08 NOTE — TELEPHONE ENCOUNTER
[facility] do not handle the PA for botox, I was given the number [phone] which was [facility], [place] was started over the phone and went to review with the clinical staff. Record # is [id number] and we will get a response via fax.

## 2021-07-09 NOTE — ED PROVIDER NOTES
EMERGENCY DEPARTMENT ENCOUNTER    Pt Name: Mira Cogan  MRN: [id number]  [place] 2000  Date of evaluation: 7/9/21  CHIEF COMPLAINT       Chief Complaint   Patient presents with    Epistaxis     on and off since sunday     HISTORY OF PRESENT ILLNESS   Patient triaged for epistaxis. When I went to patient's exam room  History and exam she had left urgency department before being seen. REVIEW OF SYSTEMS     Review of Systems  PASTMEDICAL HISTORY     Past Medical History:   Diagnosis Date    Acute pyelonephritis 11/8 - 11/11/2004    ; normal cystogram and vcug 2006    ADHD (attention deficit hyperactivity disorder) 5/2008    Allergic rhinitis 8/2010    Asthma 8/2011    abnormal PFT    Astigmatism     Obesity 9/3/2014    Pes planovalgus, acquired 12/2010    referred to ortho    Vesicoureteral reflux 11/9/04    normal cysto 11/2/06, and normal vcug 5/19/06. SURGICAL HISTORY       Past Surgical History:   Procedure Laterality Date    FOOT SURGERY Left      CURRENT MEDICATIONS       Discharge Medication List as of 7/7/2021  3:15 AM      CONTINUE these medications which have NOT CHANGED    Details   oxybutynin (DITROPAN XL) 10 MG extended release tablet Take 1 tablet by mouth daily, Disp-30 tablet, R-3Normal      Glycopyrronium Tosylate (QBREXZA) 2.4 % PADS Apply to affected areas daily, Disp-60 each, R-2Normal      glycopyrrolate (ROBINUL) 1 MG tablet Take 1 mg by mouth 3 times dailyHistorical Med      amphetamine-dextroamphetamine (ADDERALL XR) 20 MG extended release capsule Take 20 mg by mouth 2 times daily. Historical Med      naproxen (NAPROSYN) 500 MG tablet Take 1 tablet by mouth 2 times daily (with meals), Disp-60 tablet,R-6Normal      albuterol (PROVENTIL) (2.5 MG/3ML) 0.083% nebulizer solution Take 3 mLs by nebulization every 6 hours as needed for Wheezing, Disp-120 each,R-4Normal      albuterol sulfate  (90 Base) MCG/ACT inhaler Inhale 2 puffs into the lungs every 4 hours as needed for Wheezing, Disp-1 Inhaler,R-3Normal           ALLERGIES     has No Known Allergies. FAMILY HISTORY     She indicated that her mother is alive. She indicated that her father is alive. She indicated that both of her sisters are alive. She indicated that both of her brothers are alive. She indicated that the status of her neg hx is unknown. SOCIAL HISTORY       Social History     Tobacco Use    Smoking status: Never Smoker    Smokeless tobacco: Never Used   Substance Use Topics    Alcohol use: No    Drug use: No     PHYSICAL EXAM     INITIAL VITALS: BP (!) 142/87   Pulse 91   Temp 97.7 °F (36.5 °C) (Oral)   Resp 14   Ht 5' 3\" (1.6 m)   Wt 230 lb (104.3 kg)   SpO2 98%   BMI 40.74 kg/m²    Physical Exam    MEDICAL DECISION MAKING:            CRITICAL CARE:       PROCEDURES:    Procedures    DIAGNOSTIC RESULTS   EKG:All EKG's are interpreted by the Emergency Department Physician who either signs or Co-signs this chart in the absence of a cardiologist.        RADIOLOGY:All plain film, CT, MRI, and formal ultrasound images (except ED bedside ultrasound) are read by the radiologist, see reports below, unless otherwisenoted in MDM or here. No orders to display     LABS: All lab results were reviewed by myself, and all abnormals are listed below. Labs Reviewed - No data to display    EMERGENCY DEPARTMENTCOURSE:         Vitals:    Vitals:    07/07/21 0128 07/07/21 0129   BP:  (!) 142/87   Pulse:  91   Resp:  14   Temp: 97.7 °F (36.5 °C)    TempSrc: Oral    SpO2:  98%   Weight: 230 lb (104.3 kg)    Height: 5' 3\" (1.6 m)        The patient was given the following medications while in the emergency department:  No orders of the defined types were placed in this encounter. CONSULTS:  None    FINAL IMPRESSION      1. Epistaxis          DISPOSITION/PLAN   DISPOSITION Lwbs After Rn Triage 07/09/2021 04:12:29 AM      PATIENT REFERRED TO:  No follow-up provider specified.   DISCHARGE MEDICATIONS:  Discharge Medication List as of 7/7/2021  3:15 AM        Linus Cuello MD  Attending Emergency Physician                    Rosemary Thomas MD  07/09/21 6621

## 2021-07-13 ENCOUNTER — TELEPHONE (OUTPATIENT)
Dept: DERMATOLOGY | Age: 21
End: 2021-07-13

## 2021-07-13 DIAGNOSIS — R61 HYPERHIDROSIS: Primary | ICD-10-CM

## 2021-07-13 DIAGNOSIS — L74.510 AXILLARY HYPERHIDROSIS: ICD-10-CM

## 2021-07-13 RX ORDER — ONABOTULINUMTOXINA 100 [USP'U]/1
INJECTION, POWDER, LYOPHILIZED, FOR SOLUTION INTRADERMAL; INTRAMUSCULAR
Qty: 100 UNITS | Refills: 2 | Status: SHIPPED | OUTPATIENT
Start: 2021-07-13 | End: 2021-10-04 | Stop reason: SDUPTHER

## 2021-07-26 RX ORDER — LORATADINE 10 MG/1
10 TABLET ORAL DAILY
COMMUNITY

## 2021-07-26 RX ORDER — DIAPER,BRIEF,INFANT-TODD,DISP
EACH MISCELLANEOUS
COMMUNITY

## 2021-08-26 ENCOUNTER — HOSPITAL ENCOUNTER (OUTPATIENT)
Dept: MRI IMAGING | Facility: CLINIC | Age: 21
Discharge: HOME | End: 2021-08-28
Payer: COMMERCIAL

## 2021-08-26 DIAGNOSIS — M79.672 LEFT FOOT PAIN: ICD-10-CM

## 2021-08-26 DIAGNOSIS — M25.572 ACUTE LEFT ANKLE PAIN: ICD-10-CM

## 2021-08-26 PROCEDURE — 73718 MRI LOWER EXTREMITY W/O DYE: CPT

## 2021-10-04 ENCOUNTER — OFFICE VISIT (OUTPATIENT)
Dept: DERMATOLOGY | Age: 21
End: 2021-10-04
Payer: COMMERCIAL

## 2021-10-04 VITALS
WEIGHT: 212.4 LBS | TEMPERATURE: 97.4 F | HEART RATE: 75 BPM | OXYGEN SATURATION: 98 % | SYSTOLIC BLOOD PRESSURE: 132 MMHG | HEIGHT: 63 IN | BODY MASS INDEX: 37.63 KG/M2 | DIASTOLIC BLOOD PRESSURE: 85 MMHG

## 2021-10-04 DIAGNOSIS — R61 HYPERHIDROSIS: Primary | ICD-10-CM

## 2021-10-04 DIAGNOSIS — L74.510 AXILLARY HYPERHIDROSIS: ICD-10-CM

## 2021-10-04 PROCEDURE — 1036F TOBACCO NON-USER: CPT | Performed by: DERMATOLOGY

## 2021-10-04 PROCEDURE — G8427 DOCREV CUR MEDS BY ELIG CLIN: HCPCS | Performed by: DERMATOLOGY

## 2021-10-04 PROCEDURE — 99214 OFFICE O/P EST MOD 30 MIN: CPT | Performed by: DERMATOLOGY

## 2021-10-04 PROCEDURE — G8484 FLU IMMUNIZE NO ADMIN: HCPCS | Performed by: DERMATOLOGY

## 2021-10-04 PROCEDURE — G8417 CALC BMI ABV UP PARAM F/U: HCPCS | Performed by: DERMATOLOGY

## 2021-10-04 RX ORDER — ONABOTULINUMTOXINA 100 [USP'U]/1
INJECTION, POWDER, LYOPHILIZED, FOR SOLUTION INTRADERMAL; INTRAMUSCULAR
Qty: 100 EACH | Refills: 2 | Status: SHIPPED | OUTPATIENT
Start: 2021-10-04

## 2021-10-04 NOTE — PROGRESS NOTES
Dermatology Patient Note  [facility]  [address]  [address]  Dept: [phone]  Dept Fax: [phone]      VISITDATE: 10/4/2021   REFERRING PROVIDER: No ref. provider found      Michi Wakefield is a 24 y.o. female  who presents today in the office for:    Excessive Sweating (Pt has used the Glycopyrronium Tosylate pads on the legs which has helped,but did not help under her arms. She has not tried the glycopyrrolate pills as of yet. We have not started Botox, its approved and has to be shipped to [facility]. )      HISTORY OF PRESENT ILLNESS:  As above. MEDICAL PROBLEMS:  Patient Active Problem List    Diagnosis Date Noted    Left lower quadrant abdominal pain 01/30/2020    IUD (intrauterine device) in place 06/13/2019    IUD strings lost 06/12/2019     TVUS 6/12/19 IUD in correct place near fundus  TVUS 4/7/21:   IUD is in   satisfactory position within the endometrial body/fundus.  Eczema 03/29/2016    Congenital tarsal coalition 02/02/2016    Acne vulgaris 12/29/2015    Morbidly obese (HCC) 09/03/2014    Seasonal allergies 06/28/2012    Astigmatism     Pes planovalgus, acquired      Left.  ADHD (attention deficit hyperactivity disorder) 05/01/2008    Asthma 11/28/2006       CURRENT MEDICATIONS:   Current Outpatient Medications   Medication Sig Dispense Refill    onabotulinumtoxin A (BOTOX) 100 units injection Inject 50 units intradermally into each axillae q 3 months 100 each 2    loratadine (CLARITIN) 10 MG tablet Take 10 mg by mouth daily      hydrocortisone 1 % ointment Apply topically Apply topically 2 times daily.        oxybutynin (DITROPAN XL) 10 MG extended release tablet Take 1 tablet by mouth daily 30 tablet 3    Glycopyrronium Tosylate (QBREXZA) 2.4 % PADS Apply to affected areas daily 60 each 2    glycopyrrolate (ROBINUL) 1 MG tablet Take 1 mg by mouth 3 times daily      amphetamine-dextroamphetamine (ADDERALL XR) 20 MG extended release capsule Take 20 mg by mouth 2 times daily.  naproxen (NAPROSYN) 500 MG tablet Take 1 tablet by mouth 2 times daily (with meals) 60 tablet 6    albuterol (PROVENTIL) (2.5 MG/3ML) 0.083% nebulizer solution Take 3 mLs by nebulization every 6 hours as needed for Wheezing 120 each 4    albuterol sulfate  (90 Base) MCG/ACT inhaler Inhale 2 puffs into the lungs every 4 hours as needed for Wheezing 1 Inhaler 3    Levonorgestrel (MIRENA, 52 MG, IU) by IntraUTERine route       No current facility-administered medications for this visit. ALLERGIES:   No Known Allergies    SOCIAL HISTORY:  Social History     Tobacco Use    Smoking status: Never Smoker    Smokeless tobacco: Never Used   Substance Use Topics    Alcohol use: No       Pertinent ROS:  Review of Systems  Skin: Denies any new changing, growing or bleeding lesions or rashes except as described in the HPI   Constitutional: Denies fevers, chills, and malaise. PHYSICAL EXAM:   /85 (Site: Left Upper Arm, Position: Sitting, Cuff Size: Medium Adult)   Pulse 75   Temp 97.4 °F (36.3 °C) (Temporal)   Ht 5' 3\" (1.6 m)   Wt 212 lb 6.4 oz (96.3 kg)   SpO2 98%   BMI 37.62 kg/m²     The patient is generally well appearing, well nourished, alert and conversational. Affect is normal.    Cutaneous Exam:  Physical Exam  Focused exam of axillae was performed    Facial covering was not removed during examination. Diagnoses/exam findings/medical history pertinent to this visit are listed below:    Assessment:   Diagnosis Orders   1. Hyperhidrosis     2.  Axillary hyperhidrosis  onabotulinumtoxin A (BOTOX) 100 units injection        Plan:  Hyperhidrosis, generalized but worst in axillae nad groin  - chronic illness, responding to treatment but not yet at goal   - will start botox injections for axillae (ordered today)  - continue Qbrexa wipes to groin  - discussed trying oxybutynin as well. Can take prn for severe itching    RTC for botox    No future appointments. There are no Patient Instructions on file for this visit. This note was created with the assistance of a speech-recognition program.  Although the intention is to generate a document that actually reflects the content of the visit, no guarantees can be provided that every mistake has been identified and corrected by editing. I, Dr. Radha James, personally performed the services described in this documentation, as scribed by [facility] in my presence, and it is both accurate and complete.      Electronically signed by Pat Lee MD on 10/4/21 at 11:26 AM EDT

## 2021-10-20 ENCOUNTER — HOSPITAL ENCOUNTER (OUTPATIENT)
Dept: CT IMAGING | Facility: CLINIC | Age: 21
Discharge: HOME | End: 2021-10-22
Payer: COMMERCIAL

## 2021-10-20 DIAGNOSIS — M77.52 LEFT CALCANEAL BURSITIS: ICD-10-CM

## 2021-10-20 PROCEDURE — 73700 CT LOWER EXTREMITY W/O DYE: CPT

## 2021-11-20 ENCOUNTER — HOSPITAL ENCOUNTER (OUTPATIENT)
Dept: LAB | Age: 21
Setting detail: SPECIMEN
Discharge: HOME | End: 2021-11-20
Payer: COMMERCIAL

## 2021-11-20 DIAGNOSIS — Z01.818 PREOP TESTING: Primary | ICD-10-CM

## 2021-11-20 PROCEDURE — U0003 INFECTIOUS AGENT DETECTION BY NUCLEIC ACID (DNA OR RNA); SEVERE ACUTE RESPIRATORY SYNDROME CORONAVIRUS 2 (SARS-COV-2) (CORONAVIRUS DISEASE [COVID-19]), AMPLIFIED PROBE TECHNIQUE, MAKING USE OF HIGH THROUGHPUT TECHNOLOGIES AS DESCRIBED BY CMS-2020-01-R: HCPCS

## 2021-11-20 PROCEDURE — U0005 INFEC AGEN DETEC AMPLI PROBE: HCPCS

## 2021-11-21 LAB
SARS-COV-2: NORMAL
SARS-COV-2: NOT DETECTED
SOURCE: NORMAL

## 2021-11-24 ENCOUNTER — ANESTHESIA EVENT (OUTPATIENT)
Dept: OPERATING ROOM | Age: 21
End: 2021-11-24
Payer: COMMERCIAL

## 2021-11-24 ENCOUNTER — APPOINTMENT (OUTPATIENT)
Dept: GENERAL RADIOLOGY | Age: 21
End: 2021-11-24
Attending: PODIATRIST
Payer: COMMERCIAL

## 2021-11-24 ENCOUNTER — ANESTHESIA (OUTPATIENT)
Dept: OPERATING ROOM | Age: 21
End: 2021-11-24
Payer: COMMERCIAL

## 2021-11-24 ENCOUNTER — HOSPITAL ENCOUNTER (OUTPATIENT)
Age: 21
Setting detail: OUTPATIENT SURGERY
Discharge: HOME | End: 2021-11-24
Attending: PODIATRIST | Admitting: PODIATRIST
Payer: COMMERCIAL

## 2021-11-24 VITALS
WEIGHT: 194.4 LBS | HEART RATE: 57 BPM | SYSTOLIC BLOOD PRESSURE: 125 MMHG | RESPIRATION RATE: 17 BRPM | OXYGEN SATURATION: 97 % | BODY MASS INDEX: 34.45 KG/M2 | TEMPERATURE: 96.8 F | HEIGHT: 63 IN | DIASTOLIC BLOOD PRESSURE: 79 MMHG

## 2021-11-24 VITALS
DIASTOLIC BLOOD PRESSURE: 55 MMHG | TEMPERATURE: 96.3 F | OXYGEN SATURATION: 100 % | RESPIRATION RATE: 9 BRPM | SYSTOLIC BLOOD PRESSURE: 101 MMHG

## 2021-11-24 DIAGNOSIS — G89.18 POSTOPERATIVE PAIN: Primary | ICD-10-CM

## 2021-11-24 DIAGNOSIS — Z98.890 STATUS POST LEFT FOOT SURGERY: ICD-10-CM

## 2021-11-24 LAB — HCG, PREGNANCY URINE (POC): NEGATIVE

## 2021-11-24 PROCEDURE — 6360000002 HC RX W HCPCS: Performed by: PODIATRIST

## 2021-11-24 PROCEDURE — 2500000003 HC RX 250 WO HCPCS: Performed by: NURSE ANESTHETIST, CERTIFIED REGISTERED

## 2021-11-24 PROCEDURE — 3700000001 HC ADD 15 MINUTES (ANESTHESIA): Performed by: PODIATRIST

## 2021-11-24 PROCEDURE — 7100000001 HC PACU RECOVERY - ADDTL 15 MIN: Performed by: PODIATRIST

## 2021-11-24 PROCEDURE — 7100000011 HC PHASE II RECOVERY - ADDTL 15 MIN: Performed by: PODIATRIST

## 2021-11-24 PROCEDURE — 3600000002 HC SURGERY LEVEL 2 BASE: Performed by: PODIATRIST

## 2021-11-24 PROCEDURE — C1889 IMPLANT/INSERT DEVICE, NOC: HCPCS | Performed by: PODIATRIST

## 2021-11-24 PROCEDURE — 2500000003 HC RX 250 WO HCPCS: Performed by: ANESTHESIOLOGY

## 2021-11-24 PROCEDURE — 7100000010 HC PHASE II RECOVERY - FIRST 15 MIN: Performed by: PODIATRIST

## 2021-11-24 PROCEDURE — 3209999900 FLUORO FOR SURGICAL PROCEDURES

## 2021-11-24 PROCEDURE — 64445 NJX AA&/STRD SCIATIC NRV IMG: CPT | Performed by: ANESTHESIOLOGY

## 2021-11-24 PROCEDURE — 2580000003 HC RX 258: Performed by: ANESTHESIOLOGY

## 2021-11-24 PROCEDURE — 6360000002 HC RX W HCPCS: Performed by: NURSE ANESTHETIST, CERTIFIED REGISTERED

## 2021-11-24 PROCEDURE — 3600000012 HC SURGERY LEVEL 2 ADDTL 15MIN: Performed by: PODIATRIST

## 2021-11-24 PROCEDURE — 73630 X-RAY EXAM OF FOOT: CPT

## 2021-11-24 PROCEDURE — 3700000000 HC ANESTHESIA ATTENDED CARE: Performed by: PODIATRIST

## 2021-11-24 PROCEDURE — 7100000000 HC PACU RECOVERY - FIRST 15 MIN: Performed by: PODIATRIST

## 2021-11-24 PROCEDURE — 6360000002 HC RX W HCPCS: Performed by: ANESTHESIOLOGY

## 2021-11-24 PROCEDURE — 2709999900 HC NON-CHARGEABLE SUPPLY: Performed by: PODIATRIST

## 2021-11-24 PROCEDURE — 81025 URINE PREGNANCY TEST: CPT

## 2021-11-24 DEVICE — GRAFT HUM TISS W40 X L70MM THK2.5-3.5MM ACELLULAR DERM MTRX: Type: IMPLANTABLE DEVICE | Site: FOOT | Status: FUNCTIONAL

## 2021-11-24 RX ORDER — BUPIVACAINE HYDROCHLORIDE 5 MG/ML
INJECTION, SOLUTION EPIDURAL; INTRACAUDAL
Status: COMPLETED | OUTPATIENT
Start: 2021-11-24 | End: 2021-11-24

## 2021-11-24 RX ORDER — FENTANYL CITRATE 50 UG/ML
50 INJECTION, SOLUTION INTRAMUSCULAR; INTRAVENOUS EVERY 5 MIN PRN
Status: DISCONTINUED | OUTPATIENT
Start: 2021-11-24 | End: 2021-11-24 | Stop reason: HOSPADM

## 2021-11-24 RX ORDER — FENTANYL CITRATE 50 UG/ML
INJECTION, SOLUTION INTRAMUSCULAR; INTRAVENOUS PRN
Status: DISCONTINUED | OUTPATIENT
Start: 2021-11-24 | End: 2021-11-24 | Stop reason: SDUPTHER

## 2021-11-24 RX ORDER — ASPIRIN 81 MG/1
81 TABLET ORAL DAILY
Qty: 30 TABLET | Refills: 0 | Status: SHIPPED | OUTPATIENT
Start: 2021-11-24 | End: 2021-12-24

## 2021-11-24 RX ORDER — RIVAROXABAN 10 MG/1
10 TABLET, FILM COATED ORAL
Qty: 30 TABLET | Refills: 1 | Status: CANCELLED | OUTPATIENT
Start: 2021-11-24

## 2021-11-24 RX ORDER — ONDANSETRON 2 MG/ML
INJECTION INTRAMUSCULAR; INTRAVENOUS PRN
Status: DISCONTINUED | OUTPATIENT
Start: 2021-11-24 | End: 2021-11-24 | Stop reason: SDUPTHER

## 2021-11-24 RX ORDER — HYDROCODONE BITARTRATE AND ACETAMINOPHEN 5; 325 MG/1; MG/1
2 TABLET ORAL PRN
Status: DISCONTINUED | OUTPATIENT
Start: 2021-11-24 | End: 2021-11-24 | Stop reason: HOSPADM

## 2021-11-24 RX ORDER — PROPOFOL 10 MG/ML
INJECTION, EMULSION INTRAVENOUS PRN
Status: DISCONTINUED | OUTPATIENT
Start: 2021-11-24 | End: 2021-11-24 | Stop reason: SDUPTHER

## 2021-11-24 RX ORDER — ACETAMINOPHEN 325 MG/1
325 TABLET ORAL DAILY
Qty: 30 TABLET | Refills: 0 | Status: SHIPPED | OUTPATIENT
Start: 2021-11-24 | End: 2021-11-24 | Stop reason: HOSPADM

## 2021-11-24 RX ORDER — DEXAMETHASONE SODIUM PHOSPHATE 10 MG/ML
INJECTION INTRAMUSCULAR; INTRAVENOUS PRN
Status: DISCONTINUED | OUTPATIENT
Start: 2021-11-24 | End: 2021-11-24 | Stop reason: SDUPTHER

## 2021-11-24 RX ORDER — HYDROCODONE BITARTRATE AND ACETAMINOPHEN 5; 325 MG/1; MG/1
1 TABLET ORAL PRN
Status: DISCONTINUED | OUTPATIENT
Start: 2021-11-24 | End: 2021-11-24 | Stop reason: HOSPADM

## 2021-11-24 RX ORDER — MIDAZOLAM HYDROCHLORIDE 1 MG/ML
2 INJECTION INTRAMUSCULAR; INTRAVENOUS ONCE
Status: COMPLETED | OUTPATIENT
Start: 2021-11-24 | End: 2021-11-24

## 2021-11-24 RX ORDER — SODIUM CHLORIDE, SODIUM LACTATE, POTASSIUM CHLORIDE, CALCIUM CHLORIDE 600; 310; 30; 20 MG/100ML; MG/100ML; MG/100ML; MG/100ML
INJECTION, SOLUTION INTRAVENOUS CONTINUOUS
Status: DISCONTINUED | OUTPATIENT
Start: 2021-11-24 | End: 2021-11-24 | Stop reason: HOSPADM

## 2021-11-24 RX ORDER — OXYCODONE HYDROCHLORIDE AND ACETAMINOPHEN 5; 325 MG/1; MG/1
1 TABLET ORAL EVERY 6 HOURS PRN
Qty: 28 TABLET | Refills: 0 | Status: CANCELLED | OUTPATIENT
Start: 2021-11-24 | End: 2021-12-01

## 2021-11-24 RX ORDER — MIDAZOLAM HYDROCHLORIDE 1 MG/ML
INJECTION INTRAMUSCULAR; INTRAVENOUS PRN
Status: DISCONTINUED | OUTPATIENT
Start: 2021-11-24 | End: 2021-11-24 | Stop reason: SDUPTHER

## 2021-11-24 RX ORDER — ONDANSETRON 2 MG/ML
4 INJECTION INTRAMUSCULAR; INTRAVENOUS
Status: COMPLETED | OUTPATIENT
Start: 2021-11-24 | End: 2021-11-24

## 2021-11-24 RX ORDER — KETOROLAC TROMETHAMINE 30 MG/ML
INJECTION, SOLUTION INTRAMUSCULAR; INTRAVENOUS PRN
Status: DISCONTINUED | OUTPATIENT
Start: 2021-11-24 | End: 2021-11-24 | Stop reason: SDUPTHER

## 2021-11-24 RX ORDER — PROMETHAZINE HYDROCHLORIDE 25 MG/ML
6.25 INJECTION, SOLUTION INTRAMUSCULAR; INTRAVENOUS
Status: DISCONTINUED | OUTPATIENT
Start: 2021-11-24 | End: 2021-11-24 | Stop reason: HOSPADM

## 2021-11-24 RX ORDER — LIDOCAINE HYDROCHLORIDE 20 MG/ML
INJECTION, SOLUTION EPIDURAL; INFILTRATION; INTRACAUDAL; PERINEURAL PRN
Status: DISCONTINUED | OUTPATIENT
Start: 2021-11-24 | End: 2021-11-24 | Stop reason: SDUPTHER

## 2021-11-24 RX ORDER — FENTANYL CITRATE 50 UG/ML
25 INJECTION, SOLUTION INTRAMUSCULAR; INTRAVENOUS EVERY 5 MIN PRN
Status: DISCONTINUED | OUTPATIENT
Start: 2021-11-24 | End: 2021-11-24 | Stop reason: HOSPADM

## 2021-11-24 RX ADMIN — PROPOFOL 160 MG: 10 INJECTION, EMULSION INTRAVENOUS at 07:41

## 2021-11-24 RX ADMIN — KETOROLAC TROMETHAMINE 30 MG: 30 INJECTION, SOLUTION INTRAMUSCULAR; INTRAVENOUS at 08:00

## 2021-11-24 RX ADMIN — Medication 25 MCG: at 11:18

## 2021-11-24 RX ADMIN — Medication 25 MCG: at 09:35

## 2021-11-24 RX ADMIN — SODIUM CHLORIDE, POTASSIUM CHLORIDE, SODIUM LACTATE AND CALCIUM CHLORIDE: 600; 310; 30; 20 INJECTION, SOLUTION INTRAVENOUS at 06:44

## 2021-11-24 RX ADMIN — Medication 25 MCG: at 09:20

## 2021-11-24 RX ADMIN — ONDANSETRON 4 MG: 2 INJECTION, SOLUTION INTRAMUSCULAR; INTRAVENOUS at 11:12

## 2021-11-24 RX ADMIN — Medication 25 MCG: at 10:06

## 2021-11-24 RX ADMIN — DEXAMETHASONE SODIUM PHOSPHATE 10 MG: 10 INJECTION INTRAMUSCULAR; INTRAVENOUS at 07:45

## 2021-11-24 RX ADMIN — Medication 25 MCG: at 11:15

## 2021-11-24 RX ADMIN — CEFAZOLIN 2000 MG: 10 INJECTION, POWDER, FOR SOLUTION INTRAVENOUS at 07:36

## 2021-11-24 RX ADMIN — SODIUM CHLORIDE, POTASSIUM CHLORIDE, SODIUM LACTATE AND CALCIUM CHLORIDE: 600; 310; 30; 20 INJECTION, SOLUTION INTRAVENOUS at 09:50

## 2021-11-24 RX ADMIN — BUPIVACAINE HYDROCHLORIDE 30 ML: 5 INJECTION, SOLUTION EPIDURAL; INTRACAUDAL at 07:25

## 2021-11-24 RX ADMIN — MIDAZOLAM 2 MG: 1 INJECTION INTRAMUSCULAR; INTRAVENOUS at 07:18

## 2021-11-24 RX ADMIN — MIDAZOLAM 2 MG: 1 INJECTION INTRAMUSCULAR; INTRAVENOUS at 07:41

## 2021-11-24 RX ADMIN — Medication 25 MCG: at 10:03

## 2021-11-24 RX ADMIN — ONDANSETRON 4 MG: 2 INJECTION INTRAMUSCULAR; INTRAVENOUS at 12:45

## 2021-11-24 RX ADMIN — Medication 50 MCG: at 07:41

## 2021-11-24 RX ADMIN — LIDOCAINE HYDROCHLORIDE 100 MG: 20 INJECTION, SOLUTION EPIDURAL; INFILTRATION; INTRACAUDAL; PERINEURAL at 07:41

## 2021-11-24 ASSESSMENT — PULMONARY FUNCTION TESTS
PIF_VALUE: 15
PIF_VALUE: 16
PIF_VALUE: 15
PIF_VALUE: 14
PIF_VALUE: 0
PIF_VALUE: 16
PIF_VALUE: 15
PIF_VALUE: 16
PIF_VALUE: 14
PIF_VALUE: 14
PIF_VALUE: 15
PIF_VALUE: 14
PIF_VALUE: 16
PIF_VALUE: 0
PIF_VALUE: 16
PIF_VALUE: 15
PIF_VALUE: 15
PIF_VALUE: 14
PIF_VALUE: 2
PIF_VALUE: 14
PIF_VALUE: 15
PIF_VALUE: 17
PIF_VALUE: 15
PIF_VALUE: 14
PIF_VALUE: 15
PIF_VALUE: 14
PIF_VALUE: 16
PIF_VALUE: 14
PIF_VALUE: 15
PIF_VALUE: 16
PIF_VALUE: 15
PIF_VALUE: 10
PIF_VALUE: 16
PIF_VALUE: 16
PIF_VALUE: 14
PIF_VALUE: 15
PIF_VALUE: 15
PIF_VALUE: 14
PIF_VALUE: 15
PIF_VALUE: 14
PIF_VALUE: 15
PIF_VALUE: 10
PIF_VALUE: 15
PIF_VALUE: 14
PIF_VALUE: 14
PIF_VALUE: 15
PIF_VALUE: 16
PIF_VALUE: 16
PIF_VALUE: 13
PIF_VALUE: 16
PIF_VALUE: 16
PIF_VALUE: 14
PIF_VALUE: 15
PIF_VALUE: 15
PIF_VALUE: 14
PIF_VALUE: 14
PIF_VALUE: 16
PIF_VALUE: 14
PIF_VALUE: 15
PIF_VALUE: 14
PIF_VALUE: 16
PIF_VALUE: 15
PIF_VALUE: 11
PIF_VALUE: 15
PIF_VALUE: 14
PIF_VALUE: 15
PIF_VALUE: 14
PIF_VALUE: 10
PIF_VALUE: 15
PIF_VALUE: 14
PIF_VALUE: 15
PIF_VALUE: 14
PIF_VALUE: 16
PIF_VALUE: 14
PIF_VALUE: 15
PIF_VALUE: 16
PIF_VALUE: 14
PIF_VALUE: 16
PIF_VALUE: 16
PIF_VALUE: 15
PIF_VALUE: 13
PIF_VALUE: 14
PIF_VALUE: 15
PIF_VALUE: 14
PIF_VALUE: 1
PIF_VALUE: 16
PIF_VALUE: 15
PIF_VALUE: 15
PIF_VALUE: 14
PIF_VALUE: 16
PIF_VALUE: 15
PIF_VALUE: 16
PIF_VALUE: 14
PIF_VALUE: 16
PIF_VALUE: 15
PIF_VALUE: 16
PIF_VALUE: 16
PIF_VALUE: 15
PIF_VALUE: 16
PIF_VALUE: 16
PIF_VALUE: 15
PIF_VALUE: 14
PIF_VALUE: 15
PIF_VALUE: 15
PIF_VALUE: 14
PIF_VALUE: 16
PIF_VALUE: 14
PIF_VALUE: 16
PIF_VALUE: 15
PIF_VALUE: 15
PIF_VALUE: 16
PIF_VALUE: 15
PIF_VALUE: 14
PIF_VALUE: 16
PIF_VALUE: 15
PIF_VALUE: 16
PIF_VALUE: 14
PIF_VALUE: 15
PIF_VALUE: 14
PIF_VALUE: 15
PIF_VALUE: 14
PIF_VALUE: 16
PIF_VALUE: 15
PIF_VALUE: 9
PIF_VALUE: 3
PIF_VALUE: 15
PIF_VALUE: 16
PIF_VALUE: 15
PIF_VALUE: 14
PIF_VALUE: 2
PIF_VALUE: 15
PIF_VALUE: 14
PIF_VALUE: 1
PIF_VALUE: 16
PIF_VALUE: 0
PIF_VALUE: 15
PIF_VALUE: 14
PIF_VALUE: 16
PIF_VALUE: 10
PIF_VALUE: 15
PIF_VALUE: 14
PIF_VALUE: 16
PIF_VALUE: 14
PIF_VALUE: 15
PIF_VALUE: 14
PIF_VALUE: 15
PIF_VALUE: 14
PIF_VALUE: 2
PIF_VALUE: 16
PIF_VALUE: 15
PIF_VALUE: 15
PIF_VALUE: 14
PIF_VALUE: 14
PIF_VALUE: 15
PIF_VALUE: 15
PIF_VALUE: 0
PIF_VALUE: 15
PIF_VALUE: 14
PIF_VALUE: 15
PIF_VALUE: 0
PIF_VALUE: 15
PIF_VALUE: 14
PIF_VALUE: 14
PIF_VALUE: 10
PIF_VALUE: 14
PIF_VALUE: 15
PIF_VALUE: 16

## 2021-11-24 ASSESSMENT — PAIN - FUNCTIONAL ASSESSMENT: PAIN_FUNCTIONAL_ASSESSMENT: 0-10

## 2021-11-24 ASSESSMENT — ENCOUNTER SYMPTOMS: SHORTNESS OF BREATH: 0

## 2021-11-24 ASSESSMENT — PAIN SCALES - GENERAL
PAINLEVEL_OUTOF10: 0

## 2021-11-24 NOTE — ANESTHESIA POSTPROCEDURE EVALUATION
Department of Anesthesiology  Postprocedure Note    Patient: Timothy Szymanski  MRN: [id number]  YOB: 2000  Date of evaluation: 11/24/2021  Time:  1:31 PM     Procedure Summary     Date: 11/24/21 Room / Location: [address] [place] [address] - INPATIENT    Anesthesia Start: 9565 Anesthesia Stop: 0658    Procedure: CALCANEAL NAVICULAR BAR RESECTION AND Devonda Jan RESECTION LEFT - CHIKIS  ArthroFLEX implant (Left Foot) Diagnosis: (DX LEFT)    Surgeons: Raghavendra Zavala DPM Responsible Provider: Shlomo Lozano MD    Anesthesia Type: general ASA Status: 2          Anesthesia Type: general    Marcelle Phase I: Marcelle Score: 10    Marcelle Phase II:      Last vitals: Reviewed and per EMR flowsheets.        Anesthesia Post Evaluation    Complications: no

## 2021-11-24 NOTE — H&P
History and Physical Service   [facility]    HISTORY AND PHYSICAL EXAMINATION            Date of Evaluation: 11/24/2021  Patient name:  Beata Lilly  MRN:   [id number]  YOB: 2000  PCP:    MIN Downing CNP    History Obtained From:     Patient, medical records    History of Present Illness: This is Beata Lilly a 24 y.o. female who presents today for a Calcaneal navicular bar resection and talocalcaneal bar resection left foot by Dr. Shai Rodriguez for left foot pain. The patient c/o painful left foot for the past several years that has progressively worsened. Patient has stiffness in her foot with poor ROM  Aggravated by standing, walking, and wearing shoes. Treated included boot, orthotics, \"everything\" but nothing works Patient continues to have and follows with Podiatrist, Dr Shai Rodriguez, who recommended surgical intervention  due to continued pain. Shai Rodriguez also discussed continued conservative care or triple arthrodesis because she has 2 fused joints . She arrived today for her procedure. Hx asthma uses antiasthmatic medication only as needed  No DM Stopped naprosyn week ago. Past Medical History:     Past Medical History:   Diagnosis Date    Acute pyelonephritis 11/8 - 11/11/2004    ; normal cystogram and vcug 2006    ADHD (attention deficit hyperactivity disorder) 5/2008    Allergic rhinitis 8/2010    Asthma 8/2011    abnormal PFT    Astigmatism     Obesity 9/3/2014    Pes planovalgus, acquired 12/2010    referred to ortho    Vesicoureteral reflux 11/9/04    normal cysto 11/2/06, and normal vcug 5/19/06. Past Surgical History:     Past Surgical History:   Procedure Laterality Date    FOOT SURGERY Left         Medications Prior to Admission:     Prior to Admission medications    Medication Sig Start Date End Date Taking?  Authorizing Provider   Levonorgestrel (MIRENA, 52 MG, IU) by IntraUTERine route   Yes Historical Provider, MD   loratadine (CLARITIN) 10 MG tablet Take 10 mg by mouth daily   Yes Historical Provider, MD   hydrocortisone 1 % ointment Apply topically Apply topically 2 times daily. Yes Historical Provider, MD   Glycopyrronium Tosylate (QBREXZA) 2.4 % PADS Apply to affected areas daily 7/1/21  Yes Brody Chandler MD   glycopyrrolate (ROBINUL) 1 MG tablet Take 1 mg by mouth 3 times daily 1/20/21  Yes Historical Provider, MD   amphetamine-dextroamphetamine (ADDERALL XR) 20 MG extended release capsule Take 20 mg by mouth 2 times daily. 3/9/21  Yes Historical Provider, MD   naproxen (NAPROSYN) 500 MG tablet Take 1 tablet by mouth 2 times daily (with meals) 8/21/20  Yes Stacy Nash MD   onabotulinumtoxin A (BOTOX) 100 units injection Inject 50 units intradermally into each axillae q 3 months 10/4/21   Keshawn Arceo MD   oxybutynin (DITROPAN XL) 10 MG extended release tablet Take 1 tablet by mouth daily 7/1/21   Brody Chandler MD   albuterol (PROVENTIL) (2.5 MG/3ML) 0.083% nebulizer solution Take 3 mLs by nebulization every 6 hours as needed for Wheezing 8/17/20   Stacy Nash MD   albuterol sulfate  (90 Base) MCG/ACT inhaler Inhale 2 puffs into the lungs every 4 hours as needed for Wheezing 7/14/20   Elroy Agudelo MD        Allergies:     Patient has no known allergies. Social History:     Tobacco:    reports that she has never smoked. She has never used smokeless tobacco.  Alcohol:      reports no history of alcohol use. Drug Use:  reports no history of drug use.     Family History:     Family History   Problem Relation Age of Onset    Allergies Mother     Obesity Father     Allergies Brother     Other Brother         learning problems    Asthma Brother     Arthritis Neg Hx     Birth Defects Neg Hx     Cancer Neg Hx     Depression Neg Hx     Diabetes Neg Hx     Early Death Neg Hx     Hearing Loss Neg Hx     Heart Disease Neg Hx     High Blood Pressure Neg Hx     High Cholesterol Neg Hx     Kidney Disease Neg Hx Learning Disabilities Neg Hx     Mental Illness Neg Hx     Mental Retardation Neg Hx     Miscarriages / Stillbirths Neg Hx     Stroke Neg Hx     Substance Abuse Neg Hx     Vision Loss Neg Hx     Breast Cancer Neg Hx     Uterine Cancer Neg Hx     Colon Cancer Neg Hx     Ovarian Cancer Neg Hx        Review of Systems:     Positive and Negative as described in HPI. Vague historian     CONSTITUTIONAL:  negative for fevers, chills, sweats, fatigue, weight loss  HEENT:  negative for vision, hearing changes, runny nose, throat pain  RESPIRATORY: Asthma uses albuterol inhaler and nebulizer as needed. Vapes negative for shortness of breath, cough, congestion, wheezing. CARDIOVASCULAR:  negative for chest pain, palpitations. GASTROINTESTINAL:  negative for nausea, vomiting, diarrhea, constipation, change in bowel habits, abdominal pain   GENITOURINARY:  negative for difficulty of urination, burning with urination, frequency   INTEGUMENT:  negative for rash, skin lesions, easy bruising   HEMATOLOGIC/LYMPHATIC:  negative for swelling/edema   ALLERGIC/IMMUNOLOGIC:  negative for urticaria , itching  ENDOCRINE:  negative increase in drinking, increase in urination, hot or cold intolerance  MUSCULOSKELETAL: See HPI  negative joint pains, muscle aches, swelling of joints  NEUROLOGICAL:  negative for headaches, dizziness, lightheadedness, numbness, pain, tingling extremities  BEHAVIOR/PSYCH:  negative for depression, anxiety    Physical Exam:   /87   Pulse 93   Temp 96.9 °F (36.1 °C) (Temporal)   Resp 16   Ht 5' 3\" (1.6 m)   Wt 194 lb 6.4 oz (88.2 kg)   SpO2 97%   BMI 34.44 kg/m²   No LMP recorded. Patient has had an implant.   No results for input(s): POCGLU in the last 72 hours. General Appearance: obese alert, well appearing, and in no acute distress  Mental status: oriented to person, place, and time with normal affect  Head:  normocephalic, atraumatic.   Eye: no icterus, redness, pupils equal and reactive, extraocular eye movements intact, conjunctiva clear  Ear: normal external ear, no discharge, hearing intact  Nose:  no drainage noted  Mouth: mucous membranes moist  Neck: supple, no carotid bruits, thyroid not palpable  Lungs: Bilateral equal air entry, unlabored at rest,clear to ausculation, no wheezing, rales or rhonchi, normal effort  Cardiovascular: HR 93 normal rate, regular rhythm, no murmur, gallop, rub. Abdomen: Soft, obese. nontender, nondistended, normal bowel sounds  Neurologic: There are no new focal motor or sensory deficits, normal muscle tone and bulk, no abnormal sensation, normal speech, cranial nerves II through XII grossly intact  Skin: No gross lesions, rashes, bruising or bleeding on exposed skin area  Extremities:  peripheral pulses palpable, no pedal edema or calf pain with palpation  Psych: normal affect     Investigations:      Laboratory Testing:  No results found for this or any previous visit (from the past 24 hour(s)). No results for input(s): HGB, HCT, WBC, MCV, PLATELET, NA, K, CL, CO2, BUN, CREATININE, GLUCOSE, INR, PROTIME, APTT, AST, ALT, LABALBU, HCG in the last 720 hours. Recent Labs     11/20/21  1200   COVID19       Not Detected     Imaging/Diagnostics:    No results found. Diagnosis:      1. Painful left foot    Plans:     1.  Calcaneal navicular bar resection and talocalcaneal bar resection left foot      MIN Trivedi - CNP  11/24/2021  6:23 AM

## 2021-11-24 NOTE — ANESTHESIA PROCEDURE NOTES
Peripheral Block    Patient location during procedure: pre-op  Start time: 11/24/2021 7:10 AM  End time: 11/24/2021 7:13 AM  Staffing  Performed: anesthesiologist   Anesthesiologist: Elaina Mcnally MD  Preanesthetic Checklist  Completed: patient identified, IV checked, site marked, risks and benefits discussed, surgical consent, monitors and equipment checked, pre-op evaluation, timeout performed, anesthesia consent given, oxygen available and patient being monitored  Peripheral Block  Prep: ChloraPrep  Patient monitoring: cardiac monitor, continuous pulse ox, frequent blood pressure checks and IV access  Block type: Sciatic  Laterality: left  Injection technique: single-shot  Guidance: ultrasound guided  Local infiltration: lidocaine  Infiltration strength: 1 %  Dose: 3 mL  Popliteal  Provider prep: mask and sterile gloves  Local infiltration: lidocaine  Needle  Needle gauge: 21 G  Needle length: 10 cm  Needle localization: ultrasound guidance  Assessment  Injection assessment: negative aspiration for heme, no paresthesia on injection and local visualized surrounding nerve on ultrasound  Paresthesia pain: none  Slow fractionated injection: yes  Hemodynamics: stable  Additional Notes  U/S 76337.  (1) Under ultrasound guidance, a  gauge needle was inserted and placed in close proximity to the  nerve.  (2) Ultrasound was also used to visualize the spread of the anesthetic in close proximity to the nerve being blocked. (3) The nerve appeared anatomically normal, and (4 there were no apparent abnormal pathological findings on the image that were readily visible and related to the nerve being blocked. (5) A permanent ultrasound image was saved in the patient's record.             Medications Administered  Bupivacaine (MARCAINE) PF injection 0.5%, 30 mL  Reason for block: post-op pain management and at surgeon's request

## 2021-11-24 NOTE — OP NOTE
PODIATRY OP NOTE    PATIENT NAME: Britton Beal  YOB: 2000  -  24 y.o. female  MRN: [id number]  DATE: 11/24/2021  BILLING #: [id number]    Surgeon(s): Rui Ghosh DPM     ASSISTANTS: Yoana Baptiste DPM PGY3, Makeda Hayes PGY3    PRE-OP DIAGNOSIS:   Calcaneonavicular coalition, left foot  Talocalcaneal middle facet coalition, left foot  Chronic left foot pain    POST-OP DIAGNOSIS: Same as above. PROCEDURE:   Ostectomy, excision of calcaneonavicular coalition, left foot  Ostectomy, excision of calcaneonavicular coalition, left foot  Application of arthroflex graft, left foot    ANESTHESIA: General with regional popliteal block    HEMOSTASIS: Pneumatic left thigh tourniquet @ 300 mmHg for 173 minutes. ESTIMATED BLOOD LOSS: Less than 25 cc. MATERIALS: Bone wax, Arthoflex graft, 2-0 PDS, 4-0 Vicryl, 5-0 Vicryl  Implant Name Type Inv. Item Serial No.  Lot No. LRB No. Used Action   GRAFT HUM TISS W40 X L70MM THK2.5-3.5MM ACELLULAR DERM MTRX  GRAFT HUM TISS W40 X L70MM THK2.5-3.5MM ACELLULAR DERM MTRX  [facility]-WD [de-identified] Left 1 Implanted       INJECTABLES: none    SPECIMEN: none  * No specimens in log *    COMPLICATIONS: none    FINDINGS: Fibro osseous coalition noted at the CN. Osseous coalition at middle facet of TC. Coalitions removed. The patient was counseled at length about the risks of cuauhtemoc Covid-19 during their perioperative period and any recovery window from their procedure. The patient was made aware that cuauhtemoc Covid-19  may worsen their prognosis for recovering from their procedure  and lend to a higher morbidity and/or mortality risk. All material risks, benefits, and reasonable alternatives including postponing the procedure were discussed. The patient does wish to proceed with the procedure at this time.       INDICATIONS FOR PROCEDURE: Britton Beal is a 24 y.o. female well known to Dr. Shaggy Shelby with a CC of painful left foot. Patient had a longstanding issue with limited range of motion to the left foot with inversion and eversion of the left foot and ankle. Patient had failed conservative treatment. Advanced imaging showed a calcaneonavicular bar coalition and talocalcaneal middle facet coalition. Due to minimal improvement with conservative treatment, surgical resection of coalitions with application of graft was recommended to the patient. Patient is amenable to procedure and confirmed understanding. Before surgery all risks and benefits of the procedure were discussed at length prior to the patient signing the consent form. Consent is signed, witnessed, and placed in patient chart. The left foot was marked, antibiotics hung (Ancef), labs and images were reviewed, NPO status confirmed. No guarantees were given or implied. Attention was directed to the left dorsal foot. Prior to the procedure starting a Doppler was used to oscar the dorsalis pedis pulse and the posterior tibial pulse. Next a 25-gauge needle was then placed at approximately the site of where the calcaneonavicular bar was noted. Fluoroscopy was used to confirm the exact location. Once confirmed on fluoroscopy, a curvilinear incision was made using a #15 blade from the distal tip of the lateral malleolus to the third metatarsal base. Incision was deepened with a combination of sharp and blunt dissection. Care was taken to retract all vital neurovascular structures. All bleeders were ligated using Bovie cautery. Upon further dissection the extensor digitorum brevis muscle belly was identified. The extensor digitorum longus tendons were also identified. To gain access to identify the calcaneonavicular coalition , the digitorum longus tendons were transected along with the extensor digitorum brevis muscle and reflected sharply using a #15 blade. The calcaneocuboid joint as well as the talonavicular joint were then identified.   This was confirmed on fluoroscopy. There was noted to be a fibro-osseous calcaneonavicular coalition. The coalition was then resected using a combination of sharp osteotomes and bone rongeur. Throughout the resection fluoroscopy was used to confirm that the talar head was not affected. The subtalar joint was then put through a range of motion which was noted to be improved in inversion and eversion then prior to surgery. The area was then irrigated with copious amounts of normal sterile saline. Attention was then directed to the medial aspect of the left foot. A 25-gauge needle was then placed at approximately the site of where the talocalcaneal medial facet coalition was located. Fluoroscopy was used to confirm the exact location. Once confirmed on fluoroscopy, a linear incision was made using a #15 blade from the distal tip of the medial malleolus to approximately the navicular tuberosity. Incision was then deepened with a combination of sharp and blunt dissection. Care was taken to retract all vital neurovascular structures. All bleeders were ligated using Bovie cautery. Upon further dissection the posterior tibial tendon, flexor digitorum longus tendon, and flexor hallucis longus tendon were identified. The posterior tibial tendon was then retracted dorsally and the FHL and FDL tendon retracted inferiorly. At this time the tourniquet was then deflated after 121 minutes. The periosteum was then incised and reflected. A sharp osteotome was then used to resect the bone overlying the area of the coalition. Once the overlying bone was resected and confirmed on fluoroscopy the talocalcaneal middle facet coalition was identified. The area was then further resected using osteotomes and bone rongeur all of the coalition was resected. There was noted to be increased range of motion at the level of the subtalar joint. Full resection of the coalition was confirmed on C arm. Bone wax was then applied.   The surgical site was then irrigated with copious amounts of normal sterile saline. At this time the tourniquet was then reinflated for an additional 52 minutes. Attention was then redirected to the calcaneal navicular surgical site. At this time Arthrex arthro-Flex graft was then inserted at the area of the resected bone. The extensor digitorum tendons were repaired to the muscle belly using 2-0 Vicryl. The surgical sites were then closed in layers; deep closure with 2-0 PDS, subcutaneous closure with 4-0 Vicryl, subcuticular code closure with 5-0 Vicryl. The incision was dressed with adaptic, 4x4s, kerlix, and ACE. The tourniquet was then deflated and immediate hyperemia returned to all digits. A modified Kirby compressive dressing and posterior splint were then applied to the left lower extremity. The patient tolerated the procedure and anesthesia well and was transferred to the PACU with all vital signs stable and vascular status intact to the left foot and ankle. Following a period of postoperative monitoring, the patient will be discharged to home and given instructions and prescriptions which were discussed prior to the surgery. Patient will be nonweightbearing with crutches and a posterior splint to the left lower extremity. Instructed to keep dressing clean, dry, and intact until follow up with Dr. Maame Wheat.     Adeline Brown DPM   Podiatric Medicine & Surgery

## 2021-11-24 NOTE — ANESTHESIA PRE PROCEDURE
Department of Anesthesiology  Preprocedure Note       Name:  Lynette Gomez   Age:  24 y.o.  :  2000                                          MRN:  [id number]         Date:  2021      Surgeon: Abhilash Pinto): Nii South DPM    Procedure: Procedure(s):  CALCANEAL NAVICULAR BAR RESECTION AND TALOCALANEAL BAR RESECTION LEFT - [facility]  GRAFT [facility] [facility]    Medications prior to admission:   Prior to Admission medications    Medication Sig Start Date End Date Taking? Authorizing Provider   Levonorgestrel (MIRENA, 52 MG, IU) by IntraUTERine route   Yes Historical Provider, MD   loratadine (CLARITIN) 10 MG tablet Take 10 mg by mouth daily   Yes Historical Provider, MD   hydrocortisone 1 % ointment Apply topically Apply topically 2 times daily. Yes Historical Provider, MD   Glycopyrronium Tosylate (QBREXZA) 2.4 % PADS Apply to affected areas daily 21  Yes Benja Betancourt MD   glycopyrrolate (ROBINUL) 1 MG tablet Take 1 mg by mouth 3 times daily 21  Yes Historical Provider, MD   amphetamine-dextroamphetamine (ADDERALL XR) 20 MG extended release capsule Take 20 mg by mouth 2 times daily.  3/9/21  Yes Historical Provider, MD   naproxen (NAPROSYN) 500 MG tablet Take 1 tablet by mouth 2 times daily (with meals) 20  Yes Jing Hagan MD   onabotulinumtoxin A (BOTOX) 100 units injection Inject 50 units intradermally into each axillae q 3 months 10/4/21   Bernabe Snellen Heuring, MD   oxybutynin (DITROPAN XL) 10 MG extended release tablet Take 1 tablet by mouth daily 21   Benja Betancourt MD   albuterol (PROVENTIL) (2.5 MG/3ML) 0.083% nebulizer solution Take 3 mLs by nebulization every 6 hours as needed for Wheezing 20   Jing Hagan MD   albuterol sulfate  (90 Base) MCG/ACT inhaler Inhale 2 puffs into the lungs every 4 hours as needed for Wheezing 20   Naa Ricketts MD       Current medications:    Current Facility-Administered Medications   Medication Dose Route Frequency Provider Last Rate Last Admin    ceFAZolin (ANCEF) 2000 mg in dextrose 5 % 50 mL IVPB  2,000 mg IntraVENous Once Vasu Kaplan DPM        lactated ringers infusion   IntraVENous Continuous Nicole Saha  mL/hr at 11/24/21 0644 New Bag at 11/24/21 0644    lidocaine 1% (buffered) injection 0.5 mL  0.5 mL Infiltration Once Nicole Saha MD        midazolam (VERSED) injection 2 mg  2 mg IntraVENous Once Nicole Saha MD           Allergies:  No Known Allergies    Problem List:    Patient Active Problem List   Diagnosis Code    ADHD (attention deficit hyperactivity disorder) F90.9    Asthma J45.909    Pes planovalgus, acquired M21.40    Astigmatism H52.209    Seasonal allergies J30.2    Morbidly obese (HCC) E66.01    Acne vulgaris L70.0    Congenital tarsal coalition Q66.89    Eczema L30.9    IUD strings lost T83. 31XA    IUD (intrauterine device) in place Z97.5    Left lower quadrant abdominal pain R10.32       Past Medical History:        Diagnosis Date    Acute pyelonephritis 11/8 - 11/11/2004    ; normal cystogram and vcug 2006    ADHD (attention deficit hyperactivity disorder) 5/2008    Allergic rhinitis 8/2010    Arthritis     Asthma 8/2011    abnormal PFT    Astigmatism     Obesity 9/3/2014    Pes planovalgus, acquired 12/2010    referred to ortho    Vesicoureteral reflux 11/9/04    normal cysto 11/2/06, and normal vcug 5/19/06. Past Surgical History:        Procedure Laterality Date    FOOT SURGERY Left        Social History:    Social History     Tobacco Use    Smoking status: Never Smoker    Smokeless tobacco: Never Used   Substance Use Topics    Alcohol use:  No                                Counseling given: Not Answered      Vital Signs (Current):   Vitals:    11/24/21 0611 11/24/21 0615 11/24/21 0719   BP:  135/87 109/65   Pulse:  93 64   Resp:  16 18   Temp:  96.9 °F (36.1 °C)    TempSrc:  Temporal    SpO2:  97% 100%   Weight: 194 lb 6.4 oz (88.2 kg)     Height: 5' 3\" (1.6 m)                                                BP Readings from Last 3 Encounters:   11/24/21 109/65   10/04/21 132/85   07/07/21 (!) 142/87       NPO Status: Time of last liquid consumption: 2000                        Time of last solid consumption: 2000                        Date of last liquid consumption: 11/23/21                        Date of last solid food consumption: 11/23/21    BMI:   Wt Readings from Last 3 Encounters:   11/24/21 194 lb 6.4 oz (88.2 kg)   10/04/21 212 lb 6.4 oz (96.3 kg)   07/07/21 230 lb (104.3 kg)     Body mass index is 34.44 kg/m². CBC:   Lab Results   Component Value Date    WBC 5.5 10/07/2020    RBC 4.99 10/07/2020    HGB 13.5 10/07/2020    HCT 41.3 10/07/2020    MCV 82.8 10/07/2020    RDW 13.2 10/07/2020     10/07/2020       CMP:   Lab Results   Component Value Date    CREATININE 0.57 10/07/2020    GFRAA >60 10/07/2020    LABGLOM >60 10/07/2020    ALT 10 10/07/2020       POC Tests: No results for input(s): POCGLU, POCNA, POCK, POCCL, POCBUN, POCHEMO, POCHCT in the last 72 hours.     Coags: No results found for: PROTIME, INR, APTT    HCG (If Applicable):   Lab Results   Component Value Date    PREGTESTUR NEGATIVE 03/13/2012    HCG NEGATIVE 11/24/2021        ABGs: No results found for: PHART, PO2ART, XTD8LZD, ZDZ6DFI, BEART, Y4OUOVEM     Type & Screen (If Applicable):  No results found for: LABABO, LABRH    Drug/Infectious Status (If Applicable):  No results found for: HIV, HEPCAB    COVID-19 Screening (If Applicable):   Lab Results   Component Value Date    COVID19 Not Detected 11/20/2021           Anesthesia Evaluation    Airway: Mallampati: I  TM distance: >3 FB   Neck ROM: full  Mouth opening: > = 3 FB Dental:          Pulmonary:   (+) asthma:     (-) shortness of breath                           Cardiovascular:        (-)  angina                Neuro/Psych:               GI/Hepatic/Renal:             Endo/Other:                     Abdominal:

## 2021-11-24 NOTE — BRIEF OP NOTE
PODIATRY BRIEF OP NOTE    PATIENT NAME: Pearl Busch  YOB: 2000  -  24 y.o. female  MRN: [id number]  DATE: 11/24/2021  BILLING #: [id number]    Surgeon(s): Lewis Lacey DPM     ASSISTANTS: Prabha Ochoa DPM PGY3, Samy Ibanez PGY3    PRE-OP DIAGNOSIS:   1. Calcaneonavicular coalition, left foot  2. Talocalcaneal middle facet coalition, left foot  3. Chronic left foot pain    POST-OP DIAGNOSIS: Same as above. PROCEDURE:   1. Ostectomy, excision of calcaneonavicular coalition, left foot  2. Ostectomy, excision of calcaneonavicular coalition, left foot  3. Application of arthroflex graft, left foot    ANESTHESIA: General with regional popliteal block    HEMOSTASIS: Pneumatic left thigh tourniquet @ 300 mmHg for 173 minutes. ESTIMATED BLOOD LOSS: Less than 25 cc. MATERIALS: Bone wax, Arthoflex graft, 2-0 PDS, 4-0 Vicryl, 5-0 Vicryl  Implant Name Type Inv. Item Serial No.  Lot No. LRB No. Used Action   GRAFT HUM TISS W40 X L70MM THK2.5-3.5MM ACELLULAR DERM MTRX  GRAFT HUM TISS W40 X L70MM THK2.5-3.5MM ACELLULAR DERM MTRX  [facility] TISSUE [facility]-WD [de-identified] Left 1 Implanted       INJECTABLES: none    SPECIMEN: none  * No specimens in log *    COMPLICATIONS: none    FINDINGS: Fibro osseous coalition noted at the CN. Osseous coalition at middle facet of TC. Coalitions removed. The patient was counseled at length about the risks of cuauhtemoc Covid-19 during their perioperative period and any recovery window from their procedure. The patient was made aware that cuauhtemoc Covid-19  may worsen their prognosis for recovering from their procedure  and lend to a higher morbidity and/or mortality risk. All material risks, benefits, and reasonable alternatives including postponing the procedure were discussed. The patient does wish to proceed with the procedure at this time.     Prabha Ochoa DPM   Podiatric Medicine & Surgery   11/24/2021 at 11:31 AM

## 2022-01-12 ENCOUNTER — HOSPITAL ENCOUNTER (EMERGENCY)
Age: 22
Discharge: LEFT BEFORE DISCHARGE | End: 2022-01-12
Attending: EMERGENCY MEDICINE
Payer: COMMERCIAL

## 2022-01-12 VITALS
HEIGHT: 63 IN | DIASTOLIC BLOOD PRESSURE: 71 MMHG | SYSTOLIC BLOOD PRESSURE: 125 MMHG | OXYGEN SATURATION: 97 % | BODY MASS INDEX: 34.38 KG/M2 | TEMPERATURE: 99.9 F | WEIGHT: 194 LBS | RESPIRATION RATE: 19 BRPM | HEART RATE: 96 BPM

## 2022-01-12 DIAGNOSIS — G44.89 OTHER HEADACHE SYNDROME: Primary | ICD-10-CM

## 2022-01-12 PROCEDURE — 6370000000 HC RX 637 (ALT 250 FOR IP): Performed by: PHYSICIAN ASSISTANT

## 2022-01-12 PROCEDURE — 96372 THER/PROPH/DIAG INJ SC/IM: CPT

## 2022-01-12 PROCEDURE — 6360000002 HC RX W HCPCS: Performed by: PHYSICIAN ASSISTANT

## 2022-01-12 PROCEDURE — 99283 EMERGENCY DEPT VISIT LOW MDM: CPT

## 2022-01-12 RX ORDER — KETOROLAC TROMETHAMINE 30 MG/ML
30 INJECTION, SOLUTION INTRAMUSCULAR; INTRAVENOUS ONCE
Status: COMPLETED | OUTPATIENT
Start: 2022-01-12 | End: 2022-01-12

## 2022-01-12 RX ORDER — ONDANSETRON 4 MG/1
4 TABLET, ORALLY DISINTEGRATING ORAL ONCE
Status: COMPLETED | OUTPATIENT
Start: 2022-01-12 | End: 2022-01-12

## 2022-01-12 RX ADMIN — ONDANSETRON 4 MG: 4 TABLET, ORALLY DISINTEGRATING ORAL at 17:07

## 2022-01-12 RX ADMIN — KETOROLAC TROMETHAMINE 30 MG: 30 INJECTION, SOLUTION INTRAMUSCULAR; INTRAVENOUS at 17:07

## 2022-01-12 ASSESSMENT — ENCOUNTER SYMPTOMS
ABDOMINAL PAIN: 0
SHORTNESS OF BREATH: 0

## 2022-01-12 ASSESSMENT — PAIN SCALES - GENERAL
PAINLEVEL_OUTOF10: 10
PAINLEVEL_OUTOF10: 10

## 2022-01-12 ASSESSMENT — PAIN DESCRIPTION - PAIN TYPE: TYPE: ACUTE PAIN

## 2022-01-12 NOTE — ED PROVIDER NOTES
[address] [facility] ED  eMERGENCY dEPARTMENT eNCOUnter      Pt Name: Ronit Vora  MRN: [id number]  [place] 2000  Date of evaluation: 1/12/22      CHIEF COMPLAINT       Chief Complaint   Patient presents with    Migraine     Pt to ED c/o migraine worsening x 1 week. Pt reports hx of migraines with similiar symptoms. Pt denies any other associated symptoms. Pt states that she took tylenol 3 this morning, without relief          HISTORY OF PRESENT ILLNESS    Ronit Vora is a 24 y.o. female who presents complaining of headache  The history is provided by the patient. Migraine  This is a recurrent problem. The current episode started more than 1 week ago. The problem occurs every several days. The problem has not changed since onset. Associated symptoms include headaches. Pertinent negatives include no chest pain, no abdominal pain and no shortness of breath. Nothing aggravates the symptoms. Nothing relieves the symptoms. The treatment provided no relief. REVIEW OF SYSTEMS       Review of Systems   Respiratory: Negative for shortness of breath. Cardiovascular: Negative for chest pain. Gastrointestinal: Negative for abdominal pain. Neurological: Positive for headaches. All other systems reviewed and are negative. PAST MEDICAL HISTORY     Past Medical History:   Diagnosis Date    Acute pyelonephritis 11/8 - 11/11/2004    ; normal cystogram and vcug 2006    ADHD (attention deficit hyperactivity disorder) 5/2008    Allergic rhinitis 8/2010    Arthritis     Asthma 8/2011    abnormal PFT    Astigmatism     Obesity 9/3/2014    Pes planovalgus, acquired 12/2010    referred to ortho    Vesicoureteral reflux 11/9/04    normal cysto 11/2/06, and normal vcug 5/19/06.        SURGICAL HISTORY       Past Surgical History:   Procedure Laterality Date    FOOT OSTEOTOMY Left 11/24/2021    CALCANEAL NAVICULAR BAR RESECTION AND TALOCALANEAL BAR RESECTION LEFT - CHIKIS  ArthroFLEX implant performed by Radha Everett DPM at [address] Left        CURRENT MEDICATIONS       Previous Medications    ALBUTEROL (PROVENTIL) (2.5 MG/3ML) 0.083% NEBULIZER SOLUTION    Take 3 mLs by nebulization every 6 hours as needed for Wheezing    ALBUTEROL SULFATE  (90 BASE) MCG/ACT INHALER    Inhale 2 puffs into the lungs every 4 hours as needed for Wheezing    AMPHETAMINE-DEXTROAMPHETAMINE (ADDERALL XR) 20 MG EXTENDED RELEASE CAPSULE    Take 20 mg by mouth 2 times daily. ASPIRIN 81 MG EC TABLET    Take 1 tablet by mouth daily    GLYCOPYRROLATE (ROBINUL) 1 MG TABLET    Take 1 mg by mouth 3 times daily    GLYCOPYRRONIUM TOSYLATE (QBREXZA) 2.4 % PADS    Apply to affected areas daily    HYDROCORTISONE 1 % OINTMENT    Apply topically Apply topically 2 times daily. LEVONORGESTREL (MIRENA, 52 MG, IU)    by IntraUTERine route    LORATADINE (CLARITIN) 10 MG TABLET    Take 10 mg by mouth daily    NAPROXEN (NAPROSYN) 500 MG TABLET    Take 1 tablet by mouth 2 times daily (with meals)    ONABOTULINUMTOXIN A (BOTOX) 100 UNITS INJECTION    Inject 50 units intradermally into each axillae q 3 months    OXYBUTYNIN (DITROPAN XL) 10 MG EXTENDED RELEASE TABLET    Take 1 tablet by mouth daily       ALLERGIES     has No Known Allergies. FAMILY HISTORY     She indicated that her mother is alive. She indicated that her father is alive. She indicated that both of her sisters are alive. She indicated that both of her brothers are alive. She indicated that the status of her neg hx is unknown. SOCIAL HISTORY      reports that she has never smoked. She has never used smokeless tobacco. She reports that she does not drink alcohol and does not use drugs. PHYSICAL EXAM     INITIAL VITALS: /71   Pulse 96   Temp 99.9 °F (37.7 °C) (Oral)   Resp 19   Ht 5' 3\" (1.6 m)   Wt 194 lb (88 kg)   SpO2 97%   BMI 34.37 kg/m²      Physical Exam  Vitals and nursing note reviewed.    Constitutional: Appearance: She is well-developed. HENT:      Head: Normocephalic and atraumatic. Right Ear: Tympanic membrane normal.      Left Ear: Tympanic membrane normal.      Nose: Nose normal.      Mouth/Throat:      Mouth: Mucous membranes are moist.   Eyes:      Extraocular Movements: Extraocular movements intact. Pupils: Pupils are equal, round, and reactive to light. Cardiovascular:      Rate and Rhythm: Normal rate and regular rhythm. Pulses: Normal pulses. Heart sounds: Normal heart sounds. No murmur heard. Pulmonary:      Effort: Pulmonary effort is normal.      Breath sounds: Normal breath sounds. Abdominal:      General: Bowel sounds are normal.      Palpations: Abdomen is soft. Tenderness: There is no abdominal tenderness. Musculoskeletal:         General: Normal range of motion. Cervical back: Normal range of motion. Skin:     General: Skin is warm and dry. Neurological:      Mental Status: She is alert and oriented to person, place, and time. Cranial Nerves: No cranial nerve deficit. MEDICAL DECISION MAKING:     Typical hedache for pt. Not worst HA of her life, similar to previous, no neck pain or fever. Gradual onset of headache was not thunderclap onset. While in the emergency department she was given analgesics and antiemetics. I went back into reevaluate the patient and found that she had left the building. DIAGNOSTIC RESULTS     EKG: All EKG's are interpreted by the Emergency Department Physician who either signs or Co-signs this chart in the absence of acardiologist.        RADIOLOGY:[place]plain film, CT, MRI, and formal ultrasound images (except ED bedside ultrasound) are read by the radiologist and the images and interpretations are directly viewed by the emergency physician. LABS:All lab results were reviewed by myself, and all abnormals are listed below.   Labs Reviewed - No data to display      EMERGENCY DEPARTMENT COURSE: Vitals:    Vitals:    01/12/22 1617   BP: 125/71   Pulse: 96   Resp: 19   Temp: 99.9 °F (37.7 °C)   TempSrc: Oral   SpO2: 97%   Weight: 194 lb (88 kg)   Height: 5' 3\" (1.6 m)       The patient was given the following medications while in the emergency department:  Orders Placed This Encounter   Medications    ondansetron (ZOFRAN-ODT) disintegrating tablet 4 mg    ketorolac (TORADOL) injection 30 mg       -------------------------      CRITICAL CARE:     CONSULTS:  None    PROCEDURES:  Procedures    FINAL IMPRESSION      1. Other headache syndrome          DISPOSITION/PLAN   DISPOSITION Eloped - Left Before Treatment Complete 01/12/2022 06:19:29 PM      PATIENT REFERREDTO:  No follow-up provider specified.     DISCHARGEMEDICATIONS:  New Prescriptions    No medications on file       (Please note that portions of this note were completed with a voice recognition program.  Efforts were made to edit thedictations but occasionally words are mis-transcribed.)    SAMUEL Napoles PA-C  01/12/22 [id number]

## 2022-01-12 NOTE — ED PROVIDER NOTES
Patient's evaluation and treatment discussed with Jordon ZULETA. I am in agreement with patient's care as noted. This patient presents to the emergency department with complaint of bifrontal headache which has had for the past week. She reports the headache waxes and wanes but never completely resolves. There has been no trauma. No fevers or chills. No nausea, vomiting, diarrhea. No cough. No difficulty breathing. No sore throat. No disturbance of vision, smell, taste, hearing. No numbness, weakness, tingling. No dizziness or vertigo. No chest or abdominal pain. Patient reports a gradual onset of symptoms. This is not the worst headache of her life. She has been taking ibuprofen without resolution of her headache. She does have a positive family history of recurrent headaches. Awake, alert. cooperative, responsive, oriented x4. GCS-15. Perrl, EOMI, fundi-flat discs, sharp margins, no hemorrhage or exudate. CN's II-XII intact. Neck supple, nontender. Gait normal.  Should be noted the patient is wearing the walking boot on her left foot and ankle secondary to recent surgery on the foot. Speech fluent, normal comprehension. No focal motor or sensory deficits. Romberg is normal.  Neck is without carotid bruit. Scalp is normocephalic and atraumatic. No photophobia noted. Impression: Chronic recurrent headache. Plan: Patient will receive analgesic medications as noted. We will recheck and if her headache is improved we will discharge.        Rosanna Davies MD  01/12/22 7980

## 2022-01-12 NOTE — ED NOTES
Writer in room, pt and all belongings not in room at this time.   Abena Velazco, GEOVANNI, notified      Tej West RN  01/12/22 0768

## 2022-01-12 NOTE — ED NOTES
Pt ambulatory in ED ngo, states that she is feeling improved and ready for discharge.       Angi Everett RN  01/12/22 1150

## 2022-02-18 NOTE — PROGRESS NOTES
Pt is a new pt here today for an annual exam with a string check. Pt states has had some irregular bleeding.     GAD7-0  PHQ2-0

## 2022-02-21 ENCOUNTER — HOSPITAL ENCOUNTER (OUTPATIENT)
Age: 22
Setting detail: SPECIMEN
Discharge: HOME | End: 2022-02-21

## 2022-02-21 ENCOUNTER — OFFICE VISIT (OUTPATIENT)
Dept: OBGYN CLINIC | Age: 22
End: 2022-02-21
Payer: COMMERCIAL

## 2022-02-21 VITALS
HEIGHT: 63 IN | WEIGHT: 201.3 LBS | BODY MASS INDEX: 35.67 KG/M2 | DIASTOLIC BLOOD PRESSURE: 80 MMHG | HEART RATE: 87 BPM | SYSTOLIC BLOOD PRESSURE: 127 MMHG

## 2022-02-21 DIAGNOSIS — Z30.431 INTRAUTERINE DEVICE SURVEILLANCE: ICD-10-CM

## 2022-02-21 DIAGNOSIS — Z01.419 WOMEN'S ANNUAL ROUTINE GYNECOLOGICAL EXAMINATION: Primary | ICD-10-CM

## 2022-02-21 DIAGNOSIS — T83.32XA INTRAUTERINE CONTRACEPTIVE DEVICE THREADS LOST, INITIAL ENCOUNTER: ICD-10-CM

## 2022-02-21 DIAGNOSIS — Z20.2 STD EXPOSURE: ICD-10-CM

## 2022-02-21 PROBLEM — Z97.5 IUD (INTRAUTERINE DEVICE) IN PLACE: Chronic | Status: ACTIVE | Noted: 2019-06-13

## 2022-02-21 PROBLEM — R10.32 LEFT LOWER QUADRANT ABDOMINAL PAIN: Status: RESOLVED | Noted: 2020-01-30 | Resolved: 2022-02-21

## 2022-02-21 PROCEDURE — G8484 FLU IMMUNIZE NO ADMIN: HCPCS | Performed by: ADVANCED PRACTICE MIDWIFE

## 2022-02-21 PROCEDURE — 99385 PREV VISIT NEW AGE 18-39: CPT | Performed by: ADVANCED PRACTICE MIDWIFE

## 2022-02-21 SDOH — ECONOMIC STABILITY: TRANSPORTATION INSECURITY
IN THE PAST 12 MONTHS, HAS LACK OF TRANSPORTATION KEPT YOU FROM MEETINGS, WORK, OR FROM GETTING THINGS NEEDED FOR DAILY LIVING?: NO

## 2022-02-21 SDOH — ECONOMIC STABILITY: FOOD INSECURITY: WITHIN THE PAST 12 MONTHS, YOU WORRIED THAT YOUR FOOD WOULD RUN OUT BEFORE YOU GOT MONEY TO BUY MORE.: NEVER TRUE

## 2022-02-21 SDOH — ECONOMIC STABILITY: FOOD INSECURITY: WITHIN THE PAST 12 MONTHS, THE FOOD YOU BOUGHT JUST DIDN'T LAST AND YOU DIDN'T HAVE MONEY TO GET MORE.: NEVER TRUE

## 2022-02-21 SDOH — ECONOMIC STABILITY: TRANSPORTATION INSECURITY
IN THE PAST 12 MONTHS, HAS THE LACK OF TRANSPORTATION KEPT YOU FROM MEDICAL APPOINTMENTS OR FROM GETTING MEDICATIONS?: NO

## 2022-02-21 ASSESSMENT — PATIENT HEALTH QUESTIONNAIRE - PHQ9
1. LITTLE INTEREST OR PLEASURE IN DOING THINGS: 0
SUM OF ALL RESPONSES TO PHQ QUESTIONS 1-9: 0
SUM OF ALL RESPONSES TO PHQ QUESTIONS 1-9: 0
SUM OF ALL RESPONSES TO PHQ9 QUESTIONS 1 & 2: 0
SUM OF ALL RESPONSES TO PHQ QUESTIONS 1-9: 0
2. FEELING DOWN, DEPRESSED OR HOPELESS: 0
SUM OF ALL RESPONSES TO PHQ QUESTIONS 1-9: 0

## 2022-02-21 ASSESSMENT — ANXIETY QUESTIONNAIRES
4. TROUBLE RELAXING: 0
3. WORRYING TOO MUCH ABOUT DIFFERENT THINGS: 0
7. FEELING AFRAID AS IF SOMETHING AWFUL MIGHT HAPPEN: 0
5. BEING SO RESTLESS THAT IT IS HARD TO SIT STILL: 0
1. FEELING NERVOUS, ANXIOUS, OR ON EDGE: 0
2. NOT BEING ABLE TO STOP OR CONTROL WORRYING: 0
GAD7 TOTAL SCORE: 0
6. BECOMING EASILY ANNOYED OR IRRITABLE: 0

## 2022-02-21 ASSESSMENT — SOCIAL DETERMINANTS OF HEALTH (SDOH)
WITHIN THE LAST YEAR, HAVE YOU BEEN KICKED, HIT, SLAPPED, OR OTHERWISE PHYSICALLY HURT BY YOUR PARTNER OR EX-PARTNER?: NO
WITHIN THE LAST YEAR, HAVE YOU BEEN HUMILIATED OR EMOTIONALLY ABUSED IN OTHER WAYS BY YOUR PARTNER OR EX-PARTNER?: NO
HOW HARD IS IT FOR YOU TO PAY FOR THE VERY BASICS LIKE FOOD, HOUSING, MEDICAL CARE, AND HEATING?: NOT VERY HARD
WITHIN THE LAST YEAR, HAVE TO BEEN RAPED OR FORCED TO HAVE ANY KIND OF SEXUAL ACTIVITY BY YOUR PARTNER OR EX-PARTNER?: NO
WITHIN THE LAST YEAR, HAVE YOU BEEN AFRAID OF YOUR PARTNER OR EX-PARTNER?: NO

## 2022-02-21 ASSESSMENT — ENCOUNTER SYMPTOMS
RESPIRATORY NEGATIVE: 1
GASTROINTESTINAL NEGATIVE: 1

## 2022-02-21 NOTE — PROGRESS NOTES
Date of Visit: 2/21/2022    SUBJECTIVE:  Chief Complaint   Patient presents with    Gynecologic Exam    New Patient       HPI  Rolando Toscano arrives as a new patient for annual Well Woman exam.  Patient denies concerns today. States was previously sexually active, not now and desires STI testing  She also reports she has an IUD in, which she likes and has no issues with. Previously had a cyst but resolved on repeat US. States she has not been able to feel strings and also had to have US last time she was seen for same    Her No LMP recorded. Patient has had an implant. Margi Gillis Has had some light spotting     Her bowel habits are regular. She denies any bloating. She denies dysuria. She denies urinary leaking. She denies vaginal discharge. She is not sexually active but has been and desires testing  She uses IUDfor contraception and is not desiring pregnancy. Depression/Anxiety screen:  [facility] Scores 2/21/2022 3/23/2021 1/14/2020 4/24/2019 2/16/2018 12/13/2016   PHQ2 Score 0 0 0 0 0 0   PHQ9 Score 0 0 0 0 2 0     Interpretation of Total Score Depression Severity: 1-4 = Minimal depression, 5-9 = Mild depression, 10-14 = Moderate depression, 15-19 = Moderately severe depression, 20-27 = Severe depression  SINDY 7 SCORE 2/21/2022   SINDY-7 Total Score 0     Interpretation of SINDY-7 score: 5-9 = mild anxiety, 10-14 = moderate anxiety, 15+ = severe anxiety. Recommend referral to behavioral health for scores 10 or greater. Review of Systems   Constitutional: Negative. HENT: Negative. Respiratory: Negative. Cardiovascular: Negative. Gastrointestinal: Negative. Genitourinary: Negative. Musculoskeletal: Negative. Skin: Negative. Neurological: Negative. Psychiatric/Behavioral: Negative.           PREVENTIVE HEALTH SCREENING:   Date of last pap:  ?   HPV typing/date: NA  Gardisil vaccine:  Yes, has received    Date of last mammogram: NA  Date of last DEXA scan: NA  Date of last colonoscopy: NA    Preventive screening through PCP: Yes    Family history of Breast, Ovarian, Colon or Uterine Cancer:  None     See updated review in Media     Genetic counseling:  Done and declined      GYNECOLOGIC HISTORY:  Menarche: teens    STD history: No     Contraception: IUD      Physical Exam:     Chaperone for Intimate Exam   Chaperone was offered as part of the rooming process. Patient declined and agrees to continue with exam without a chaperone.  Chaperone: NA    Constitutional:   Blood pressure 127/80, pulse 87, height 5' 3\" (1.6 m), weight 201 lb 4.8 oz (91.3 kg), not currently breastfeeding. Wt Readings from Last 3 Encounters:   02/21/22 201 lb 4.8 oz (91.3 kg)   01/12/22 194 lb (88 kg)   11/24/21 194 lb 6.4 oz (88.2 kg)       General Appearance: This is a well-developed, well-nourished and well-groomed female. Alert and not in distress  Skin:  Inspection of the skin revealed no rashes or lesions  Neck and EENT:  No eye discharge and sclera non-icteric  Lips, teeth and gums without lesions and normal dentition  Nares are patent without discharge  Normal external ears are present with no hearing loss  The neck was supple with full range of motion . No enlarged cervical lymph nodes  Lymphatic:   No lymph nodes were palpable in neck, axilla or groin   Neurologic:    Normal speech, no focal findings or movement disorder noted  Respiratory: The lungs were clear to auscultation bilaterally. There were no rales, rhonchi or wheezes. There was good respiratory effort. Cardiovascular: The heart was in a regular rhythm and rate was normal.  No murmur or extra sounds were noted. Breast:  The breasts are pendulous in size and asymmetrical, R>L, this is her normal.  There are no skin changes with position changes. The nipples are without deviations or discharge. No masses were palpated, but there are pronounced fibrocystic changes bilaterally.  No axillary or supraclavicular lymphadenopathy is present. Back:  Straight with no CVA tenderness present  Abdomen: The abdomen is soft, non-tender and obese. There was no guarding, rebound or rigidity. The bladder was without fullness or tenderness. No hernias were appreciated. Pelvic: The external genitalia has a normal appearance without masses or lesions. There is no inguinal lymphadenopathy. Bladder/urethra without discharge or mass. Normal urethra. The vagina is pink and well rugated. Scant discharge in the vault, Affirm obtained. The cervix is without lesions or discharge and with no CMT. No IUD strings were visible. Probing with cytobrush did not elicit strings. Pap was obtained without difficulty. The uterus is midline, mobile, normal size/shape and non-tender. The adnexa are without masses or tenderness. Rectum is normal.  Musculoskeletal:   Normal gait. No contractions with normal movement of all extremities. Range of motion appropriate for patient's age. Extremities:  No cyanosis or edema present. No calf tenderness  Psychiatric:  Alert, oriented to time, place, person and situation. There are no mood or affect changes. ASSESSMENT/PLAN:  1. Women's annual routine gynecological examination  - PAP Smear; Future  - Chlamydia Trachomatis & Neisseria gonorrhoeae (GC) by amplified detection; Future  - Hepatitis A Antibody, Total; Future  - Hepatitis B Surface Antibody; Future  - T. pallidum Ab; Future  - HIV Screen; Future  - Hepatitis C Antibody; Future    2.  STD exposure  AGE <39 Counseling and Evaluation  Sexuality/Reproductive Planning  [x] Discussed birth control as needed and reviewed ACHES; discussed cyst formation with IUD, she is to call if pelvic pain for pelvic US  [] Reproductive plan discussed  [] Preconception/genetic counseling  [x] Sexual function: No concerns  [x] Discussed STI counseling/prevention  [x] Discussed Gardisil   Fitness/Nutrition  [x] Physical activity: Encouraged  [] Folic Acid supplementation discussed  Health/Risk Assessment  [x] Discussed annual Well-Woman exams every year; Pap per ASCCP guidelines and testing  [x] Breast self-awareness reinforced  [x] Hereditary Breast/Ovarian Cancer screening: Declined   [x] Hepatitis C screening: Ordered today  [x] Tobacco & Secondary smoke risks: Not a smoker  [x] Health maintenance through PCP  Psychosocial Evaluation  [] Intimate partner violence screening  [x] Depression/Anxiety screening: Negative  Cardiovascular Risk Factors  [] Family history  [] Hypertension  [] Dyslipidemia  [] Obesity  [] Diabetes Mellitus  [] Personal hx of PreE, GDM, or PIH  [] Lifestyle  - Chlamydia Trachomatis & Neisseria gonorrhoeae (GC) by amplified detection; Future  - Hepatitis A Antibody, Total; Future  - Hepatitis B Surface Antibody; Future  - T. pallidum Ab; Future  - HIV Screen; Future  - Hepatitis C Antibody; Future  - Vaginitis DNA Probe; Future    3. Intrauterine contraceptive device threads lost, initial encounter  - Await US results  - [address]; Future  - US PELVIS COMPLETE; Future    4. Intrauterine device surveillance  - Aware of need to check strings but has not been able to feel        The patient, Michelle Marques,  was seen with a total time spent of 20 minutes for the visit on this date of service by the [facility]  The time component, involved both face-to-face (counseling and education)  and non face-to-face time (care coordination), spent in determining the total time component. She was also counseled on her preventative health maintenance recommendations and follow-up.     Electronically Signed by MIN Morris CNM

## 2022-02-22 DIAGNOSIS — Z20.2 STD EXPOSURE: ICD-10-CM

## 2022-02-22 LAB
CANDIDA SPECIES, DNA PROBE: NEGATIVE
GARDNERELLA VAGINALIS, DNA PROBE: NEGATIVE
SOURCE: NORMAL
TRICHOMONAS VAGINALIS DNA: NEGATIVE

## 2022-02-23 LAB
CHLAMYDIA BY THIN PREP: NEGATIVE
N. GONORRHOEAE DNA, THIN PREP: NEGATIVE
SPECIMEN DESCRIPTION: NORMAL

## 2022-03-01 ENCOUNTER — HOSPITAL ENCOUNTER (OUTPATIENT)
Dept: ULTRASOUND IMAGING | Facility: CLINIC | Age: 22
Discharge: HOME | End: 2022-03-03
Payer: COMMERCIAL

## 2022-03-01 ENCOUNTER — HOSPITAL ENCOUNTER (OUTPATIENT)
Age: 22
Setting detail: SPECIMEN
Discharge: HOME | End: 2022-03-01

## 2022-03-01 DIAGNOSIS — T83.32XA INTRAUTERINE CONTRACEPTIVE DEVICE THREADS LOST, INITIAL ENCOUNTER: ICD-10-CM

## 2022-03-01 DIAGNOSIS — Z01.419 WOMEN'S ANNUAL ROUTINE GYNECOLOGICAL EXAMINATION: ICD-10-CM

## 2022-03-01 DIAGNOSIS — Z20.2 STD EXPOSURE: ICD-10-CM

## 2022-03-01 LAB
HAV AB SERPL IA-ACNC: REACTIVE
HBV SURFACE AB TITR SER: <3.5 MIU/ML
HEPATITIS C ANTIBODY: NONREACTIVE
HIV AG/AB: NONREACTIVE
T. PALLIDUM, IGG: NONREACTIVE

## 2022-03-01 PROCEDURE — 76856 US EXAM PELVIC COMPLETE: CPT

## 2022-03-02 ENCOUNTER — TELEPHONE (OUTPATIENT)
Dept: OBGYN CLINIC | Age: 22
End: 2022-03-02

## 2022-03-02 DIAGNOSIS — B15.9 HEPATITIS A TEST POSITIVE: Primary | ICD-10-CM

## 2022-03-02 NOTE — TELEPHONE ENCOUNTER
----- Message from Fabian Rodriguez, Violette Lopez Patoka sent at 3/2/2022  2:42 PM EST -----  Regarding: positive Hepatitis A  Please call her and let her know her Hepatitis A is positive  I need to order another lab to confirm that she has either had it in the past or had vaccine  It is ordered and she can go any time to have drawn    Thanks!

## 2022-03-02 NOTE — TELEPHONE ENCOUNTER
Lm for pt to call office for lab results. [Contraception/ Emergency Contraception/ Safe Sexual Practices] : contraception, emergency contraception, safe sexual practices

## 2022-03-03 ENCOUNTER — HOSPITAL ENCOUNTER (OUTPATIENT)
Age: 22
Setting detail: SPECIMEN
Discharge: HOME | End: 2022-03-03

## 2022-03-03 DIAGNOSIS — B15.9 HEPATITIS A TEST POSITIVE: ICD-10-CM

## 2022-03-03 LAB — HAV IGM SER IA-ACNC: NONREACTIVE

## 2022-03-07 PROBLEM — R87.612 LGSIL ON PAP SMEAR OF CERVIX: Status: ACTIVE | Noted: 2022-03-07

## 2022-03-07 LAB — CYTOLOGY REPORT: NORMAL

## 2022-03-08 LAB
HPV SAMPLE: ABNORMAL
HPV, GENOTYPE 16: NOT DETECTED
HPV, GENOTYPE 18: NOT DETECTED
HPV, HIGH RISK OTHER: DETECTED
HPV, INTERPRETATION: ABNORMAL
SPECIMEN DESCRIPTION: ABNORMAL

## 2022-08-15 ENCOUNTER — OFFICE VISIT (OUTPATIENT)
Dept: OBGYN CLINIC | Age: 22
End: 2022-08-15
Payer: COMMERCIAL

## 2022-08-15 ENCOUNTER — HOSPITAL ENCOUNTER (OUTPATIENT)
Age: 22
Setting detail: SPECIMEN
Discharge: HOME | End: 2022-08-15

## 2022-08-15 VITALS
HEIGHT: 63 IN | DIASTOLIC BLOOD PRESSURE: 82 MMHG | WEIGHT: 213.5 LBS | SYSTOLIC BLOOD PRESSURE: 116 MMHG | HEART RATE: 64 BPM | BODY MASS INDEX: 37.83 KG/M2

## 2022-08-15 DIAGNOSIS — N89.8 VAGINAL DISCHARGE: ICD-10-CM

## 2022-08-15 DIAGNOSIS — N89.8 VAGINAL DISCHARGE: Primary | ICD-10-CM

## 2022-08-15 PROCEDURE — G8427 DOCREV CUR MEDS BY ELIG CLIN: HCPCS | Performed by: ADVANCED PRACTICE MIDWIFE

## 2022-08-15 PROCEDURE — G8417 CALC BMI ABV UP PARAM F/U: HCPCS | Performed by: ADVANCED PRACTICE MIDWIFE

## 2022-08-15 PROCEDURE — 1036F TOBACCO NON-USER: CPT | Performed by: ADVANCED PRACTICE MIDWIFE

## 2022-08-15 PROCEDURE — 99213 OFFICE O/P EST LOW 20 MIN: CPT | Performed by: ADVANCED PRACTICE MIDWIFE

## 2022-08-15 RX ORDER — RIZATRIPTAN BENZOATE 10 MG/1
TABLET ORAL
COMMUNITY
Start: 2022-05-31

## 2022-08-15 RX ORDER — CYCLOBENZAPRINE HCL 10 MG
TABLET ORAL
COMMUNITY
Start: 2022-08-03

## 2022-08-15 RX ORDER — VALACYCLOVIR HYDROCHLORIDE 500 MG/1
TABLET, FILM COATED ORAL
COMMUNITY
Start: 2022-05-27

## 2022-08-15 RX ORDER — IBUPROFEN 800 MG/1
TABLET ORAL
COMMUNITY
Start: 2022-08-03

## 2022-08-15 RX ORDER — KETOROLAC TROMETHAMINE 10 MG/1
TABLET, FILM COATED ORAL
COMMUNITY
Start: 2022-07-27

## 2022-08-15 RX ORDER — RIZATRIPTAN BENZOATE 10 MG/1
10 TABLET ORAL
COMMUNITY
Start: 2022-06-01

## 2022-08-15 RX ORDER — TOPIRAMATE 50 MG/1
TABLET, FILM COATED ORAL
COMMUNITY
Start: 2022-07-27

## 2022-08-15 RX ORDER — LORAZEPAM 0.5 MG/1
TABLET ORAL
COMMUNITY
Start: 2021-10-13

## 2022-08-15 NOTE — PROGRESS NOTES
Patient is here today so vaginal discharge x2 months no itching/odor/cottage cheese like discharge    Pt does have an IUD- put in 2019

## 2022-08-16 ENCOUNTER — TELEPHONE (OUTPATIENT)
Dept: OBGYN CLINIC | Age: 22
End: 2022-08-16

## 2022-08-16 NOTE — TELEPHONE ENCOUNTER
----- Message from MIN Coker CNM sent at 8/16/2022 10:14 AM EDT -----  Let her know that there is no infection.

## 2023-01-31 ENCOUNTER — HOSPITAL ENCOUNTER (EMERGENCY)
Age: 23
Discharge: HOME | End: 2023-01-31
Attending: STUDENT IN AN ORGANIZED HEALTH CARE EDUCATION/TRAINING PROGRAM
Payer: COMMERCIAL

## 2023-01-31 VITALS
HEIGHT: 63 IN | SYSTOLIC BLOOD PRESSURE: 141 MMHG | RESPIRATION RATE: 16 BRPM | BODY MASS INDEX: 38.98 KG/M2 | TEMPERATURE: 97.3 F | HEART RATE: 80 BPM | OXYGEN SATURATION: 99 % | WEIGHT: 220 LBS | DIASTOLIC BLOOD PRESSURE: 74 MMHG

## 2023-01-31 DIAGNOSIS — J06.9 VIRAL URI: Primary | ICD-10-CM

## 2023-01-31 LAB
INFLUENZA A: NOT DETECTED
INFLUENZA B: NOT DETECTED
S PYO AG THROAT QL: NEGATIVE
SARS-COV-2 RNA, RT PCR: NOT DETECTED
SOURCE: NORMAL
SOURCE: NORMAL
SPECIMEN DESCRIPTION: NORMAL

## 2023-01-31 PROCEDURE — 87880 STREP A ASSAY W/OPTIC: CPT

## 2023-01-31 PROCEDURE — 99283 EMERGENCY DEPT VISIT LOW MDM: CPT

## 2023-01-31 PROCEDURE — 87636 SARSCOV2 & INF A&B AMP PRB: CPT

## 2023-01-31 RX ORDER — IBUPROFEN 400 MG/1
400 TABLET ORAL EVERY 6 HOURS PRN
Qty: 30 TABLET | Refills: 0 | Status: SHIPPED | OUTPATIENT
Start: 2023-01-31

## 2023-01-31 NOTE — ED NOTES
Mode of arrival (squad #, walk in, police, etc) : Walk in         Chief complaint(s): sore throat, body aches, nausea         Arrival Note (brief scenario, treatment PTA, etc). : Pt states above symptoms since saturday. Pt denies chest pain or SOB. Vitals stable. C= \"Have you ever felt that you should Cut down on your drinking? \"  No  A= \"Have people Annoyed you by criticizing your drinking? \"  No  G= \"Have you ever felt bad or Guilty about your drinking? \"  No  E= \"Have you ever had a drink as an Eye-opener first thing in the morning to steady your nerves or to help a hangover? \"  No      Deferred []      Reason for deferring: N/A    *If yes to two or more: probable alcohol abuse. Starr Reid  01/31/23 1022

## 2023-01-31 NOTE — DISCHARGE INSTRUCTIONS
Please follow up with PCP. Recommend rest, increase fluids. Return to the ED if you develop worsening symptoms or develop chest pain, shortness of breath, abdominal pain.

## 2023-01-31 NOTE — ED PROVIDER NOTES
[address] ED  eMERGENCY dEPARTMENT eNCOUnter      Pt Name: Ida Gibbs  MRN: [id number]  [place] 2000  Date of evaluation: 1/31/2023  Provider: Payton Kat PA-C    CHIEF COMPLAINT       Chief Complaint   Patient presents with    Pharyngitis    Headache    Generalized Body Aches    Nausea           HISTORY OF PRESENT ILLNESS  (Location/Symptom, Timing/Onset, Context/Setting, Quality, Duration, Modifying Factors, Severity.)   Ida Gibbs is a 25 y.o. female who presents to the emergency department for evaluation of headache, body aches, congestion, nausea, and sore throat x 4 days. Pt denies fever, ear pain, cough, chest pain, shortness of breath, emesis, abd pain. Reports she was exposed to covid and two other sick contacts. She has no other complaints. Nursing Notes were reviewed. REVIEW OF SYSTEMS    (2-9 systems for level 4, 10 or more for level 5)     Review of Systems   Headache  Body aches  Congestion  Nausea  Sore throat      Except as noted above the remainder of the review of systems was reviewed and negative. PAST MEDICAL HISTORY     Past Medical History:   Diagnosis Date    Acute pyelonephritis 11/8 - 11/11/2004    ; normal cystogram and vcug 2006    ADHD (attention deficit hyperactivity disorder) 5/2008    Allergic rhinitis 8/2010    Arthritis     Asthma 8/2011    abnormal PFT    Astigmatism     Obesity 9/3/2014    Pes planovalgus, acquired 12/2010    referred to ortho    Vesicoureteral reflux 11/9/04    normal cysto 11/2/06, and normal vcug 5/19/06.      None otherwise stated in nurses notes    SURGICAL HISTORY       Past Surgical History:   Procedure Laterality Date    FOOT OSTEOTOMY Left 11/24/2021    CALCANEAL NAVICULAR BAR RESECTION AND TALOCALANEAL BAR RESECTION LEFT - [facility]IS  ArthroFLEX implant performed by mymission2, DPM at [facility]      None otherwise stated in nurses notes    [address]       Discharge Medication List as of 1/31/2023 11:18 AM        CONTINUE these medications which have NOT CHANGED    Details   cyclobenzaprine (FLEXERIL) 10 MG tablet Historical Med      !! ibuprofen (ADVIL;MOTRIN) 800 MG tablet Historical Med      ketorolac (TORADOL) 10 MG tablet Historical Med      LORazepam (ATIVAN) 0.5 MG tablet Nightly as needed max 4 nights a weekHistorical Med      !! rizatriptan (MAXALT) 10 MG tablet Historical Med      !! rizatriptan (MAXALT) 10 MG tablet Take 10 mg by mouth once as neededHistorical Med      topiramate (TOPAMAX) 50 MG tablet Historical Med      valACYclovir (VALTREX) 500 MG tablet Historical Med      aspirin 81 MG EC tablet Take 1 tablet by mouth daily, Disp-30 tablet, R-0Print      Levonorgestrel (MIRENA, 52 MG, IU) by IntraUTERine routeHistorical Med      onabotulinumtoxin A (BOTOX) 100 units injection Inject 50 units intradermally into each axillae q 3 months, Disp-100 each, R-2Normal      loratadine (CLARITIN) 10 MG tablet Take 10 mg by mouth dailyHistorical Med      hydrocortisone 1 % ointment Apply topically Apply topically 2 times daily. , Topical, Historical Med      oxybutynin (DITROPAN XL) 10 MG extended release tablet Take 1 tablet by mouth daily, Disp-30 tablet, R-3Normal      Glycopyrronium Tosylate (QBREXZA) 2.4 % PADS Apply to affected areas daily, Disp-60 each, R-2Normal      glycopyrrolate (ROBINUL) 1 MG tablet Take 1 mg by mouth 3 times dailyHistorical Med      amphetamine-dextroamphetamine (ADDERALL XR) 20 MG extended release capsule Take 20 mg by mouth 2 times daily. Historical Med      naproxen (NAPROSYN) 500 MG tablet Take 1 tablet by mouth 2 times daily (with meals), Disp-60 tablet,R-6Normal      albuterol (PROVENTIL) (2.5 MG/3ML) 0.083% nebulizer solution Take 3 mLs by nebulization every 6 hours as needed for Wheezing, Disp-120 each,R-4Normal      albuterol sulfate  (90 Base) MCG/ACT inhaler Inhale 2 puffs into the lungs every 4 hours as needed for Wheezing, Disp-1 Inhaler,R-3Normal       !! - Potential duplicate medications found. Please discuss with provider. ALLERGIES     Hydrocodone-acetaminophen    FAMILY HISTORY           Problem Relation Age of Onset    Allergies Mother     Obesity Father     Allergies Brother     Other Brother         learning problems    Asthma Brother     Arthritis Neg Hx     Birth Defects Neg Hx     Cancer Neg Hx     Depression Neg Hx     Diabetes Neg Hx     Early Death Neg Hx     Hearing Loss Neg Hx     Heart Disease Neg Hx     High Blood Pressure Neg Hx     High Cholesterol Neg Hx     Kidney Disease Neg Hx     Learning Disabilities Neg Hx     Mental Illness Neg Hx     Mental Retardation Neg Hx     Miscarriages / Stillbirths Neg Hx     Stroke Neg Hx     Substance Abuse Neg Hx     Vision Loss Neg Hx     Breast Cancer Neg Hx     Uterine Cancer Neg Hx     Colon Cancer Neg Hx     Ovarian Cancer Neg Hx      Family Status   Relation Name Status    Mother andrea Alive    Father sharon Alive    Brother gemma Alive    Brother konstantin Alive    Sister nickie Alive    Sister emeterio Alive    Neg Hx  (Not Specified)      None otherwise stated in nurses notes    SOCIAL HISTORY      reports that she has never smoked. She has never used smokeless tobacco. She reports that she does not drink alcohol and does not use drugs. lives at home with others     PHYSICAL EXAM    (up to 7 for level 4, 8 or more for level 5)     ED Triage Vitals [01/31/23 1019]   BP Temp Temp Source Heart Rate Resp SpO2 Height Weight   (!) 141/74 97.3 °F (36.3 °C) Temporal 80 16 99 % 5' 3\" (1.6 m) 220 lb (99.8 kg)       Physical Exam   Nursing note and vitals reviewed. Constitutional: Oriented to person, place, and time and well-developed, well-nourished. Head: Normocephalic and atraumatic. Ear: External ears normal. TM non-erythematous bilaterally with no canal erythema or swelling. No mastoid tenderness. Nose: Nose normal and midline.    Eyes: Conjunctivae and EOM are normal. Pupils are equal, round, and reactive to light. Neck: Normal range of motion. Neck supple. Throat: Posterior pharynx is without erythema or exudates, airway is patent, no swelling  Cardiovascular: Normal rate, regular rhythm, normal heart sounds and intact distal pulses. Pulmonary/Chest: Effort normal and breath sounds normal. No respiratory distress. No wheezes. No rales. No rhonchi. No chest tenderness. Abdominal: Soft. Bowel sounds are normal. No distension and no mass. There is no tenderness. There is no rebound and no guarding. Musculoskeletal: Normal range of motion. Neurological: Alert and oriented to person, place, and time. GCS score is 15. Skin: Skin is warm and dry. No rash noted. No erythema. No pallor. Psychiatric: Mood, memory, affect and judgment normal.           DIAGNOSTIC RESULTS     EKG: All EKG's are interpreted by the Emergency Department Physician who either signs or Co-signs this chart in the absence of a cardiologist.        RADIOLOGY:   All plain film, CT, MRI, and formal ultrasound images (except ED bedside ultrasound) are read by the radiologist, see reports below, unless otherwise noted in MDM or here. No orders to display       No results found. LABS:  Labs Reviewed   COVID-19 & INFLUENZA COMBO   STREP SCREEN GROUP A THROAT       All other labs were within normal range or not returned as of this dictation.     EMERGENCY DEPARTMENT COURSE and DIFFERENTIAL DIAGNOSIS/MDM:   Vitals:    Vitals:    01/31/23 1019   BP: (!) 141/74   Pulse: 80   Resp: 16   Temp: 97.3 °F (36.3 °C)   TempSrc: Temporal   SpO2: 99%   Weight: 220 lb (99.8 kg)   Height: 5' 3\" (1.6 m)         Patient instructed to return to the emergency room if symptoms worsen, return, or any other concern right away which is agreed by the patient    ED MEDS:  Orders Placed This Encounter   Medications    ibuprofen (IBU) 400 MG tablet     Sig: Take 1 tablet by mouth every 6 hours as needed for Pain Dispense:  30 tablet     Refill:  0         CONSULTS:  None    PROCEDURES:  None      FINAL IMPRESSION      1. Viral URI          DISPOSITION/PLAN   DISPOSITION Decision To Discharge    PATIENT REFERRED TO:  [facility] SURGERY  [facility]  [address] [id number] [phone]  Call       [facility] ED  [place] [address]  [phone]        DISCHARGE MEDICATIONS:  Discharge Medication List as of 1/31/2023 11:18 AM        START taking these medications    Details   !! ibuprofen (IBU) 400 MG tablet Take 1 tablet by mouth every 6 hours as needed for Pain, Disp-30 tablet, R-0Normal       !! - Potential duplicate medications found. Please discuss with provider. Summation      Patient Course:    URI symptoms x several days. Headache, sore throat, body aches, diarrhea. Exposed to sick contacts with similar symptoms. No cp, sob, abd pain, cough. Lungs are clear. Pt is in no distress. VSS. Covid, flu, strep are negative. I suspect viral URI. Low concern for mono, pneumonia, PE. Recommend OTC medications. Discussed results and plan with the pt. They expressed appropriate understanding. Pt given close follow up, supportive care instructions and strict return instructions at the bedside. The care is provided during an unprecedented national emergency due to the novel coronavirus, COVID-19. ED Medications administered this visit:  Medications - No data to display    New Prescriptions from this visit:    Discharge Medication List as of 1/31/2023 11:18 AM        START taking these medications    Details   !! ibuprofen (IBU) 400 MG tablet Take 1 tablet by mouth every 6 hours as needed for Pain, Disp-30 tablet, R-0Normal       !! - Potential duplicate medications found. Please discuss with provider.           Follow-up:  [facility] SURGERY  [facility] [address]  [address] [id number] [phone]  Call       [facility] ED  [place] [address] [place] [address]          Final Impression:   1.  Viral URI               (Please note that portions of this note were completed with a voice recognition program )        SAMUEL Worthy PA-C  01/31/23 9209

## 2023-01-31 NOTE — ED PROVIDER NOTES
eMERGENCY dEPARTMENT eNCOUnter   Independent Attestation     Pt Name: Navid Light  MRN: [id number]  [place] 2000  Date of evaluation: 1/31/23     Navid Light is a 25 y.o. female with CC: Pharyngitis, Headache, Generalized Body Aches, and Nausea      Based on the medical record the care appears appropriate. I was personally available for consultation in the Emergency Department.       Kenisha Ochoa DO  Attending Emergency Physician          Kenisha Ochoa DO  01/31/23 9151

## 2023-01-31 NOTE — Clinical Note
Gareth Post was seen and treated in our emergency department on 1/31/2023. She may return to work on 02/01/2023. If you have any questions or concerns, please don't hesitate to call.       Wen Ambrose PA-C

## 2023-04-24 ENCOUNTER — APPOINTMENT (OUTPATIENT)
Dept: GENERAL RADIOLOGY | Age: 23
End: 2023-04-24
Payer: COMMERCIAL

## 2023-04-24 ENCOUNTER — HOSPITAL ENCOUNTER (EMERGENCY)
Age: 23
Discharge: HOME | End: 2023-04-24
Attending: EMERGENCY MEDICINE
Payer: COMMERCIAL

## 2023-04-24 VITALS
SYSTOLIC BLOOD PRESSURE: 127 MMHG | DIASTOLIC BLOOD PRESSURE: 95 MMHG | RESPIRATION RATE: 16 BRPM | HEART RATE: 78 BPM | OXYGEN SATURATION: 98 % | TEMPERATURE: 98.1 F

## 2023-04-24 DIAGNOSIS — S69.91XA FINGER INJURY, RIGHT, INITIAL ENCOUNTER: Primary | ICD-10-CM

## 2023-04-24 PROCEDURE — 99283 EMERGENCY DEPT VISIT LOW MDM: CPT

## 2023-04-24 PROCEDURE — 73130 X-RAY EXAM OF HAND: CPT

## 2023-04-24 PROCEDURE — 6370000000 HC RX 637 (ALT 250 FOR IP): Performed by: STUDENT IN AN ORGANIZED HEALTH CARE EDUCATION/TRAINING PROGRAM

## 2023-04-24 RX ORDER — IBUPROFEN 600 MG/1
600 TABLET ORAL ONCE
Status: COMPLETED | OUTPATIENT
Start: 2023-04-24 | End: 2023-04-24

## 2023-04-24 RX ADMIN — IBUPROFEN 600 MG: 600 TABLET, FILM COATED ORAL at 20:27

## 2023-04-24 ASSESSMENT — LIFESTYLE VARIABLES
HOW OFTEN DO YOU HAVE A DRINK CONTAINING ALCOHOL: NEVER
HOW MANY STANDARD DRINKS CONTAINING ALCOHOL DO YOU HAVE ON A TYPICAL DAY: PATIENT DOES NOT DRINK

## 2023-04-25 ASSESSMENT — ENCOUNTER SYMPTOMS
VOMITING: 0
ABDOMINAL DISTENTION: 0
CONSTIPATION: 0
SHORTNESS OF BREATH: 0
BACK PAIN: 0
COLOR CHANGE: 1
DIARRHEA: 0
ABDOMINAL PAIN: 0

## 2023-04-25 NOTE — ED PROVIDER NOTES
[address] ED  eMERGENCY dEPARTMENT eNCOUnter   Attending Attestation     Pt Name: Марина Foreman  MRN: [id number]  [place] 2000  Date of evaluation: 4/24/23    History, EXAM, MDM:    Марина Foreman is a 25 y.o. female who presents with Finger Pain (Patient states that she was lifting a safe to place it on a pallet and injured her right index finger. Pt denies any pain, but states that she cannot feel anything in the index finger. Pt denies any pain to hand or wrist )    Moving a safe, and developed r. Index finger pain afterwards. No deformity. Capillary refill appropriate. Passive ROM appropriate. Xr shows no fracture or dislocation. Analgesia provided. Recommended close follow up with pcp, return to ED if symptoms worsen, and warning precautions provided. Vitals:   Vitals:    04/24/23 2008 04/24/23 2009 04/24/23 2017   BP: (!) 127/95     Pulse:   78   Resp:   16   Temp:   98.1 °F (36.7 °C)   SpO2:  98%      I performed a history and physical examination of the patient and discussed management with the resident. I reviewed the residents note and agree with the documented findings and plan of care. Any areas of disagreement are noted on the chart. I was personally present for the key portions of any procedures. I have documented in the chart those procedures where I was not present during the key portions. I have personally reviewed all images and agree with the resident's interpretation. I have reviewed the emergency nurses triage note. I agree with the chief complaint, past medical history, past surgical history, allergies, medications, social and family history as documented unless otherwise noted below. Documentation of the HPI, Physical Exam and Medical Decision Making performed by medical students or scribes is based on my personal performance of the HPI, PE and MDM.  For Phys Assistant/ Nurse Practitioner cases/documentation I have had a face to face evaluation of

## 2023-04-25 NOTE — ED PROVIDER NOTES
[address] ED  Emergency Department Encounter  Emergency Medicine Resident     Pt Name:Tiffanie Gibbons  MRN: [id number]  [place] 2000  Date of evaluation: 4/25/23  PCP:  No primary care provider on file. Note Started: 1:05 AM EDT      CHIEF COMPLAINT       Chief Complaint   Patient presents with    Finger Pain     Patient states that she was lifting a safe to place it on a pallet and injured her right index finger. Pt denies any pain, but states that she cannot feel anything in the index finger. Pt denies any pain to hand or wrist        HISTORY OF PRESENT ILLNESS  (Location/Symptom, Timing/Onset, Context/Setting, Quality, Duration, Modifying Factors, Severity.)      Nneka Meza is a 25 y.o. female who staying safe onto a pallet. She is unsure of how much it made or how big it was. She states that it did not crash her finger and she is unsure of how it hurt her finger but she has since had some tingling in the area. States that she has been unable to move the finger. Is right-hand dominant. Denies any pain. PAST MEDICAL / SURGICAL / SOCIAL / FAMILY HISTORY      has a past medical history of Acute pyelonephritis, ADHD (attention deficit hyperactivity disorder), Allergic rhinitis, Arthritis, Asthma, Astigmatism, Obesity, Pes planovalgus, acquired, and Vesicoureteral reflux. has a past surgical history that includes Foot surgery (Left) and Foot Osteotomy (Left, 11/24/2021).       Social History     Socioeconomic History    Marital status: Single     Spouse name: Not on file    Number of children: Not on file    Years of education: Not on file    Highest education level: Not on file   Occupational History    Not on file   Tobacco Use    Smoking status: Never    Smokeless tobacco: Never   Vaping Use    Vaping Use: Some days    Substances: Nicotine   Substance and Sexual Activity    Alcohol use: No    Drug use: No    Sexual activity: Not Currently     Partners: Male

## 2023-07-06 ENCOUNTER — PATIENT MESSAGE (OUTPATIENT)
Dept: OBGYN CLINIC | Age: 23
End: 2023-07-06

## 2024-02-15 ENCOUNTER — TELEPHONE (OUTPATIENT)
Dept: OBGYN CLINIC | Age: 24
End: 2024-02-15

## 2024-02-15 NOTE — TELEPHONE ENCOUNTER
PC made to Tiffanie because Alea said she is not due for another 3 yrs to get the IUD replaced. Pt didn't like my answer so I transferred her to Alea per Alea's request.. Alea then messaged me and asked me to call and get her scheduled with Dr Clay.   PC made to pt to schedule but she can't do 2/27/24 so she had to call her job to see if she has off on 3/5/24.   Will wait for call back.

## 2024-02-19 ENCOUNTER — TELEPHONE (OUTPATIENT)
Dept: OBGYN CLINIC | Age: 24
End: 2024-02-19

## 2024-02-19 NOTE — TELEPHONE ENCOUNTER
----- Message from Terri Quiroz MA sent at 2/16/2024 10:45 AM EST -----  Regarding: FW: Appointment   Contact: [phone]    ----- Message -----  From: Tiffanie Ambriz  Sent: 2/16/2024  10:14 AM EST  To: Elia Sepulveda Clinical Staff  Subject: Appointment                                      I have tried calling back yesterday if there anyone someone can call me

## 2024-03-13 NOTE — TELEPHONE ENCOUNTER
PC to pt to try and schedule appt with Dr Clay. Pt says she is starting a new job today and will find out what her schedule is and she also says she doesn't have a car so its hard for her to get around, She agreed o call to schedule when she is available.

## 2024-08-20 NOTE — TELEPHONE ENCOUNTER
Refill request for med pended        Next Visit Date:  Future Appointments   Date Time Provider Suyapa Martha   6/6/2019  1:00 PM Munira العراقي DO [facility] OB/Gyn [facility]   10/24/2019  1:15 PM Sissy Lira MD [facility] IM [address] Maintenance   Topic Date Due    Pneumococcal 0-64 years Vaccine (1 of 1 - PPSV23) 05/03/2006    Chlamydia screen  02/16/2019    Flu vaccine (Season Ended) 09/01/2019    DTaP/Tdap/Td vaccine (7 - Td) 08/12/2021    HPV vaccine  Completed    Varicella Vaccine  Completed    Meningococcal (ACWY) Vaccine  Completed    HIV screen  Completed       No results found for: LABA1C          ( goal A1C is < 7)   No results found for: LABMICR  No results found for: LDLCHOLESTEROL, LDLCALC    (goal LDL is <100)   No results found for: AST, ALT, BUN  BP Readings from Last 3 Encounters:   04/24/19 126/78   10/11/18 124/76   08/16/18 132/74          (goal 120/80)    All Future Testing planned in [facility]  Lab Frequency Next Occurrence   Chlamydia/GC DNA, Urine Once 07/24/2019               Patient Active Problem List:     ADHD (attention deficit hyperactivity disorder)     Asthma     Pes planovalgus, acquired     Astigmatism     Seasonal allergies     Obesity     Acne vulgaris     Congenital tarsal coalition     Eczema nonweight-bearing

## 2024-09-11 ENCOUNTER — HOSPITAL ENCOUNTER (EMERGENCY)
Age: 24
Discharge: HOME | End: 2024-09-11
Attending: EMERGENCY MEDICINE
Payer: COMMERCIAL

## 2024-09-11 VITALS
BODY MASS INDEX: 33.66 KG/M2 | SYSTOLIC BLOOD PRESSURE: 129 MMHG | OXYGEN SATURATION: 99 % | HEIGHT: 63 IN | WEIGHT: 190 LBS | TEMPERATURE: 98.3 F | DIASTOLIC BLOOD PRESSURE: 81 MMHG | HEART RATE: 94 BPM | RESPIRATION RATE: 19 BRPM

## 2024-09-11 DIAGNOSIS — J06.9 VIRAL URI WITH COUGH: Primary | ICD-10-CM

## 2024-09-11 LAB
SARS-COV-2 RDRP RESP QL NAA+PROBE: NOT DETECTED
SPECIMEN DESCRIPTION: NORMAL
SPECIMEN SOURCE: NORMAL
STREP A, MOLECULAR: NEGATIVE

## 2024-09-11 PROCEDURE — 6360000002 HC RX W HCPCS

## 2024-09-11 PROCEDURE — 87651 STREP A DNA AMP PROBE: CPT

## 2024-09-11 PROCEDURE — 99283 EMERGENCY DEPT VISIT LOW MDM: CPT

## 2024-09-11 PROCEDURE — 87635 SARS-COV-2 COVID-19 AMP PRB: CPT

## 2024-09-11 RX ORDER — DEXAMETHASONE SODIUM PHOSPHATE 10 MG/ML
10 INJECTION, SOLUTION INTRAMUSCULAR; INTRAVENOUS ONCE
Status: COMPLETED | OUTPATIENT
Start: 2024-09-11 | End: 2024-09-11

## 2024-09-11 RX ADMIN — DEXAMETHASONE SODIUM PHOSPHATE 10 MG: 10 INJECTION INTRAMUSCULAR; INTRAVENOUS at 17:23

## 2024-09-11 ASSESSMENT — PAIN - FUNCTIONAL ASSESSMENT: PAIN_FUNCTIONAL_ASSESSMENT: NONE - DENIES PAIN

## 2024-09-11 ASSESSMENT — ENCOUNTER SYMPTOMS
NAUSEA: 0
ABDOMINAL PAIN: 0
SHORTNESS OF BREATH: 0
SORE THROAT: 1
TROUBLE SWALLOWING: 0
VOMITING: 0

## 2024-09-11 ASSESSMENT — LIFESTYLE VARIABLES
HOW MANY STANDARD DRINKS CONTAINING ALCOHOL DO YOU HAVE ON A TYPICAL DAY: PATIENT DOES NOT DRINK
HOW OFTEN DO YOU HAVE A DRINK CONTAINING ALCOHOL: NEVER

## 2024-09-12 ENCOUNTER — HOSPITAL ENCOUNTER (EMERGENCY)
Age: 24
Discharge: HOME | End: 2024-09-12
Attending: EMERGENCY MEDICINE
Payer: COMMERCIAL

## 2024-09-12 VITALS
SYSTOLIC BLOOD PRESSURE: 133 MMHG | DIASTOLIC BLOOD PRESSURE: 94 MMHG | TEMPERATURE: 98.2 F | OXYGEN SATURATION: 100 % | RESPIRATION RATE: 20 BRPM | HEART RATE: 81 BPM

## 2024-09-12 DIAGNOSIS — J45.20 MILD INTERMITTENT ASTHMA WITHOUT COMPLICATION: ICD-10-CM

## 2024-09-12 DIAGNOSIS — J06.9 VIRAL URI WITH COUGH: Primary | ICD-10-CM

## 2024-09-12 PROCEDURE — 6360000002 HC RX W HCPCS: Performed by: EMERGENCY MEDICINE

## 2024-09-12 PROCEDURE — 99283 EMERGENCY DEPT VISIT LOW MDM: CPT

## 2024-09-12 PROCEDURE — 94640 AIRWAY INHALATION TREATMENT: CPT

## 2024-09-12 PROCEDURE — 6370000000 HC RX 637 (ALT 250 FOR IP): Performed by: EMERGENCY MEDICINE

## 2024-09-12 RX ORDER — IBUPROFEN 800 MG/1
800 TABLET, FILM COATED ORAL ONCE
Status: COMPLETED | OUTPATIENT
Start: 2024-09-12 | End: 2024-09-12

## 2024-09-12 RX ORDER — ACETAMINOPHEN 500 MG
1000 TABLET ORAL ONCE
Status: COMPLETED | OUTPATIENT
Start: 2024-09-12 | End: 2024-09-12

## 2024-09-12 RX ORDER — ALBUTEROL SULFATE 0.83 MG/ML
2.5 SOLUTION RESPIRATORY (INHALATION) EVERY 6 HOURS PRN
Status: DISCONTINUED | OUTPATIENT
Start: 2024-09-12 | End: 2024-09-12 | Stop reason: HOSPADM

## 2024-09-12 RX ORDER — PREDNISONE 20 MG/1
50 TABLET ORAL ONCE
Status: COMPLETED | OUTPATIENT
Start: 2024-09-12 | End: 2024-09-12

## 2024-09-12 RX ORDER — PREDNISONE 50 MG/1
50 TABLET ORAL DAILY
Qty: 5 TABLET | Refills: 0 | Status: SHIPPED | OUTPATIENT
Start: 2024-09-12 | End: 2024-09-17

## 2024-09-12 RX ADMIN — PREDNISONE 50 MG: 20 TABLET ORAL at 08:16

## 2024-09-12 RX ADMIN — ACETAMINOPHEN 1000 MG: 500 TABLET ORAL at 08:16

## 2024-09-12 RX ADMIN — ALBUTEROL SULFATE 2.5 MG: 2.5 SOLUTION RESPIRATORY (INHALATION) at 08:14

## 2024-09-12 RX ADMIN — IBUPROFEN 800 MG: 800 TABLET ORAL at 08:17

## 2024-09-12 ASSESSMENT — PAIN DESCRIPTION - ORIENTATION
ORIENTATION: MID
ORIENTATION: MID

## 2024-09-12 ASSESSMENT — PAIN - FUNCTIONAL ASSESSMENT
PAIN_FUNCTIONAL_ASSESSMENT: ACTIVITIES ARE NOT PREVENTED
PAIN_FUNCTIONAL_ASSESSMENT: ACTIVITIES ARE NOT PREVENTED

## 2024-09-12 ASSESSMENT — PAIN DESCRIPTION - DESCRIPTORS
DESCRIPTORS: ACHING
DESCRIPTORS: ACHING

## 2024-09-12 ASSESSMENT — PAIN SCALES - GENERAL
PAINLEVEL_OUTOF10: 0
PAINLEVEL_OUTOF10: 5
PAINLEVEL_OUTOF10: 5

## 2024-09-12 ASSESSMENT — PAIN DESCRIPTION - LOCATION
LOCATION: BACK
LOCATION: BACK

## 2024-10-15 ENCOUNTER — HOSPITAL ENCOUNTER (OUTPATIENT)
Age: 24
Setting detail: SPECIMEN
Discharge: HOME | End: 2024-10-15

## 2024-10-15 ENCOUNTER — OFFICE VISIT (OUTPATIENT)
Dept: FAMILY MEDICINE CLINIC | Age: 24
End: 2024-10-15
Payer: COMMERCIAL

## 2024-10-15 VITALS
WEIGHT: 207.4 LBS | SYSTOLIC BLOOD PRESSURE: 131 MMHG | BODY MASS INDEX: 36.75 KG/M2 | DIASTOLIC BLOOD PRESSURE: 85 MMHG | HEART RATE: 67 BPM | HEIGHT: 63 IN

## 2024-10-15 DIAGNOSIS — Z79.899 MEDICATION MANAGEMENT: Primary | ICD-10-CM

## 2024-10-15 DIAGNOSIS — Z79.899 MEDICATION MANAGEMENT: ICD-10-CM

## 2024-10-15 PROBLEM — T83.32XA IUD STRINGS LOST: Status: RESOLVED | Noted: 2019-06-12 | Resolved: 2024-10-15

## 2024-10-15 LAB
AMPHET UR QL SCN: POSITIVE
BARBITURATES UR QL SCN: NEGATIVE
BENZODIAZ UR QL: NEGATIVE
CANNABINOIDS UR QL SCN: NEGATIVE
COCAINE UR QL SCN: NEGATIVE
FENTANYL UR QL: NEGATIVE
METHADONE UR QL: NEGATIVE
OPIATES UR QL SCN: NEGATIVE
OXYCODONE UR QL SCN: NEGATIVE
PCP UR QL SCN: NEGATIVE
TEST INFORMATION: ABNORMAL

## 2024-10-15 PROCEDURE — 99204 OFFICE O/P NEW MOD 45 MIN: CPT | Performed by: FAMILY MEDICINE

## 2024-10-15 RX ORDER — HYDROXYZINE HYDROCHLORIDE 25 MG/1
25 TABLET, FILM COATED ORAL 2 TIMES DAILY PRN
COMMUNITY
End: 2024-10-15

## 2024-10-15 RX ORDER — BUSPIRONE HYDROCHLORIDE 5 MG/1
5 TABLET ORAL 2 TIMES DAILY
Qty: 60 TABLET | Refills: 0 | Status: SHIPPED | OUTPATIENT
Start: 2024-10-15 | End: 2024-10-17 | Stop reason: SDUPTHER

## 2024-10-15 RX ORDER — INHALER, ASSIST DEVICES
SPACER (EA) MISCELLANEOUS
COMMUNITY
End: 2024-10-17

## 2024-10-15 RX ORDER — IBUPROFEN 800 MG/1
400 TABLET, FILM COATED ORAL EVERY 8 HOURS PRN
Qty: 120 TABLET | Refills: 1 | Status: SHIPPED | OUTPATIENT
Start: 2024-10-15

## 2024-10-15 RX ORDER — FLUTICASONE PROPIONATE AND SALMETEROL XINAFOATE 115; 21 UG/1; UG/1
2 AEROSOL, METERED RESPIRATORY (INHALATION) 2 TIMES DAILY
COMMUNITY

## 2024-10-15 RX ORDER — FLUTICASONE PROPIONATE 50 MCG
1 SPRAY, SUSPENSION (ML) NASAL DAILY
COMMUNITY

## 2024-10-15 SDOH — ECONOMIC STABILITY: FOOD INSECURITY: WITHIN THE PAST 12 MONTHS, YOU WORRIED THAT YOUR FOOD WOULD RUN OUT BEFORE YOU GOT MONEY TO BUY MORE.: NEVER TRUE

## 2024-10-15 SDOH — ECONOMIC STABILITY: FOOD INSECURITY: WITHIN THE PAST 12 MONTHS, THE FOOD YOU BOUGHT JUST DIDN'T LAST AND YOU DIDN'T HAVE MONEY TO GET MORE.: NEVER TRUE

## 2024-10-15 SDOH — ECONOMIC STABILITY: INCOME INSECURITY: HOW HARD IS IT FOR YOU TO PAY FOR THE VERY BASICS LIKE FOOD, HOUSING, MEDICAL CARE, AND HEATING?: NOT HARD AT ALL

## 2024-10-15 ASSESSMENT — ENCOUNTER SYMPTOMS
SHORTNESS OF BREATH: 0
CONSTIPATION: 0
WHEEZING: 0
DIARRHEA: 0
COUGH: 0
ABDOMINAL PAIN: 0
BACK PAIN: 1

## 2024-10-15 ASSESSMENT — PATIENT HEALTH QUESTIONNAIRE - PHQ9
2. FEELING DOWN, DEPRESSED OR HOPELESS: NOT AT ALL
SUM OF ALL RESPONSES TO PHQ QUESTIONS 1-9: 2
SUM OF ALL RESPONSES TO PHQ QUESTIONS 1-9: 2
SUM OF ALL RESPONSES TO PHQ9 QUESTIONS 1 & 2: 2
SUM OF ALL RESPONSES TO PHQ QUESTIONS 1-9: 2
1. LITTLE INTEREST OR PLEASURE IN DOING THINGS: NOT AT ALL
SUM OF ALL RESPONSES TO PHQ QUESTIONS 1-9: 0
SUM OF ALL RESPONSES TO PHQ9 QUESTIONS 1 & 2: 0
2. FEELING DOWN, DEPRESSED OR HOPELESS: SEVERAL DAYS
SUM OF ALL RESPONSES TO PHQ QUESTIONS 1-9: 0
SUM OF ALL RESPONSES TO PHQ QUESTIONS 1-9: 2
SUM OF ALL RESPONSES TO PHQ QUESTIONS 1-9: 0
1. LITTLE INTEREST OR PLEASURE IN DOING THINGS: SEVERAL DAYS
SUM OF ALL RESPONSES TO PHQ QUESTIONS 1-9: 0

## 2024-10-15 ASSESSMENT — ANXIETY QUESTIONNAIRES
5. BEING SO RESTLESS THAT IT IS HARD TO SIT STILL: NEARLY EVERY DAY
GAD7 TOTAL SCORE: 12
7. FEELING AFRAID AS IF SOMETHING AWFUL MIGHT HAPPEN: SEVERAL DAYS
2. NOT BEING ABLE TO STOP OR CONTROL WORRYING: SEVERAL DAYS
1. FEELING NERVOUS, ANXIOUS, OR ON EDGE: SEVERAL DAYS
4. TROUBLE RELAXING: SEVERAL DAYS
IF YOU CHECKED OFF ANY PROBLEMS ON THIS QUESTIONNAIRE, HOW DIFFICULT HAVE THESE PROBLEMS MADE IT FOR YOU TO DO YOUR WORK, TAKE CARE OF THINGS AT HOME, OR GET ALONG WITH OTHER PEOPLE: VERY DIFFICULT
6. BECOMING EASILY ANNOYED OR IRRITABLE: NEARLY EVERY DAY
3. WORRYING TOO MUCH ABOUT DIFFERENT THINGS: MORE THAN HALF THE DAYS

## 2024-10-15 NOTE — PATIENT INSTRUCTIONS
Thank you for letting us take care of you today. We hope all your questions were addressed. If a question was overlooked or something else comes to mind after you return home, please contact a member of your Care Team listed below.      Your Care Team at [facility] is Team #1  Lizbeth Topete M.D. (Faculty)  Zulma Dailey M.D. (Resident)  Jose Andrew D.O. (Resident)  Jarek Song M.D. (Resident)  Raven Redding M.D. (Resident)  Adriana Busch, [facility]  You Vincent, [facility]  Darleen Sultana, [facility]  Emily Braswell, [facility]  Evelyn Pompa, [facility]  Rica Kirby, [facility]  Deja Taylor, [facility]  Julissa (LJ) Yamilex,   [facility] located within [facility] (Clinical Pharmacist)     Office phone number: [phone]    If you need to get in right away due to illness, please be advised we have \"Same Day\" appointments available Monday-Friday. Please call us at [phone] option #3 to schedule your \"Same Day\" appointment.

## 2024-10-16 DIAGNOSIS — F90.0 ATTENTION DEFICIT HYPERACTIVITY DISORDER (ADHD), PREDOMINANTLY INATTENTIVE TYPE: Primary | ICD-10-CM

## 2024-10-16 RX ORDER — DEXTROAMPHETAMINE SACCHARATE, AMPHETAMINE ASPARTATE MONOHYDRATE, DEXTROAMPHETAMINE SULFATE AND AMPHETAMINE SULFATE 5; 5; 5; 5 MG/1; MG/1; MG/1; MG/1
20 CAPSULE, EXTENDED RELEASE ORAL 2 TIMES DAILY
Qty: 30 CAPSULE | Refills: 0 | Status: SHIPPED | OUTPATIENT
Start: 2024-10-16 | End: 2024-11-15

## 2024-10-17 ENCOUNTER — PATIENT MESSAGE (OUTPATIENT)
Dept: FAMILY MEDICINE CLINIC | Age: 24
End: 2024-10-17

## 2024-10-17 DIAGNOSIS — J45.20 MILD INTERMITTENT ASTHMA WITHOUT COMPLICATION: ICD-10-CM

## 2024-10-17 DIAGNOSIS — F90.0 ATTENTION DEFICIT HYPERACTIVITY DISORDER (ADHD), PREDOMINANTLY INATTENTIVE TYPE: ICD-10-CM

## 2024-10-18 RX ORDER — IBUPROFEN 800 MG/1
400 TABLET, FILM COATED ORAL EVERY 8 HOURS PRN
Qty: 120 TABLET | Refills: 1 | OUTPATIENT
Start: 2024-10-18

## 2024-10-18 RX ORDER — BUSPIRONE HYDROCHLORIDE 5 MG/1
5 TABLET ORAL 2 TIMES DAILY
Qty: 60 TABLET | Refills: 0 | Status: SHIPPED | OUTPATIENT
Start: 2024-10-18 | End: 2024-11-17

## 2024-10-18 RX ORDER — DEXTROAMPHETAMINE SACCHARATE, AMPHETAMINE ASPARTATE MONOHYDRATE, DEXTROAMPHETAMINE SULFATE AND AMPHETAMINE SULFATE 5; 5; 5; 5 MG/1; MG/1; MG/1; MG/1
20 CAPSULE, EXTENDED RELEASE ORAL 2 TIMES DAILY
Qty: 30 CAPSULE | Refills: 0 | OUTPATIENT
Start: 2024-10-18 | End: 2024-11-17

## 2024-10-18 RX ORDER — ALBUTEROL SULFATE 90 UG/1
2 INHALANT RESPIRATORY (INHALATION) EVERY 4 HOURS PRN
Qty: 1 EACH | Refills: 3 | Status: SHIPPED | OUTPATIENT
Start: 2024-10-18

## 2024-11-13 DIAGNOSIS — F90.0 ATTENTION DEFICIT HYPERACTIVITY DISORDER (ADHD), PREDOMINANTLY INATTENTIVE TYPE: ICD-10-CM

## 2024-11-13 RX ORDER — DEXTROAMPHETAMINE SACCHARATE, AMPHETAMINE ASPARTATE MONOHYDRATE, DEXTROAMPHETAMINE SULFATE AND AMPHETAMINE SULFATE 5; 5; 5; 5 MG/1; MG/1; MG/1; MG/1
20 CAPSULE, EXTENDED RELEASE ORAL 2 TIMES DAILY
Qty: 60 CAPSULE | Refills: 0 | Status: SHIPPED | OUTPATIENT
Start: 2024-11-13 | End: 2024-12-13

## 2024-11-13 NOTE — TELEPHONE ENCOUNTER
Last visit: 10-15-24  Last Med refill:   Does patient have enough medication for 72 hours: No:     Next Visit Date:  No future appointments.    Health Maintenance   Topic Date Due    Pneumococcal 0-64 years Vaccine (1 of 2 - PCV) 05/03/2006    DTaP/Tdap/Td vaccine (7 - Td or Tdap) 08/12/2021    Chlamydia/GC screen  02/21/2023    Flu vaccine (1) 08/01/2024    COVID-19 Vaccine (1 - 2023-24 season) Never done    Pap smear  02/21/2025    Depression Screen  10/15/2025    Hepatitis A vaccine  Completed    Hepatitis B vaccine  Completed    Hib vaccine  Completed    HPV vaccine  Completed    Polio vaccine  Completed    Varicella vaccine  Completed    Meningococcal (ACWY) vaccine  Completed    Hepatitis C screen  Completed    HIV screen  Completed       Hemoglobin A1C (%)   Date Value   09/13/2019 4.9             ( goal A1C is < 7)   No components found for: \"LABMICR\"  No components found for: \"LDLCHOLESTEROL\", \"LDLCALC\"    (goal LDL is <100)   ALT (U/L)   Date Value   10/07/2020 10     BP Readings from Last 3 Encounters:   10/15/24 131/85   09/12/24 (!) 133/94   09/11/24 129/81          (goal 120/80)    All Future Testing planned in [facility]  Lab Frequency Next Occurrence               Patient Active Problem List:     ADHD (attention deficit hyperactivity disorder)     Asthma     Pes planovalgus, acquired     Astigmatism     Seasonal allergies     Morbidly obese     Acne vulgaris     Congenital tarsal coalition     Eczema     IUD (intrauterine device) in place     Hepatitis A test positive     LGSIL on Pap smear of cervix           Please address the medication refill and close the encounter.  If I can be of assistance, please route to the applicable pool.      Thank you.

## 2025-04-30 ENCOUNTER — HOSPITAL ENCOUNTER (EMERGENCY)
Age: 25
Discharge: HOME | End: 2025-04-30
Attending: EMERGENCY MEDICINE
Payer: COMMERCIAL

## 2025-04-30 ENCOUNTER — APPOINTMENT (OUTPATIENT)
Dept: GENERAL RADIOLOGY | Age: 25
End: 2025-04-30
Payer: COMMERCIAL

## 2025-04-30 VITALS
OXYGEN SATURATION: 98 % | TEMPERATURE: 97.9 F | SYSTOLIC BLOOD PRESSURE: 123 MMHG | HEART RATE: 68 BPM | RESPIRATION RATE: 14 BRPM | DIASTOLIC BLOOD PRESSURE: 79 MMHG

## 2025-04-30 DIAGNOSIS — S93.602A FOOT SPRAIN, LEFT, INITIAL ENCOUNTER: Primary | ICD-10-CM

## 2025-04-30 PROCEDURE — 99283 EMERGENCY DEPT VISIT LOW MDM: CPT

## 2025-04-30 PROCEDURE — 6370000000 HC RX 637 (ALT 250 FOR IP): Performed by: EMERGENCY MEDICINE

## 2025-04-30 PROCEDURE — 73630 X-RAY EXAM OF FOOT: CPT

## 2025-04-30 RX ORDER — ACETAMINOPHEN 500 MG
1000 TABLET ORAL ONCE
Status: COMPLETED | OUTPATIENT
Start: 2025-04-30 | End: 2025-04-30

## 2025-04-30 RX ORDER — ACETAMINOPHEN 500 MG
1000 TABLET ORAL EVERY 6 HOURS PRN
Qty: 42 TABLET | Refills: 0 | Status: SHIPPED | OUTPATIENT
Start: 2025-04-30 | End: 2025-05-05

## 2025-04-30 RX ORDER — IBUPROFEN 800 MG/1
800 TABLET, FILM COATED ORAL EVERY 6 HOURS PRN
Qty: 21 TABLET | Refills: 0 | Status: SHIPPED | OUTPATIENT
Start: 2025-04-30

## 2025-04-30 RX ORDER — IBUPROFEN 800 MG/1
800 TABLET, FILM COATED ORAL ONCE
Status: COMPLETED | OUTPATIENT
Start: 2025-04-30 | End: 2025-04-30

## 2025-04-30 RX ADMIN — ACETAMINOPHEN 1000 MG: 500 TABLET ORAL at 22:07

## 2025-04-30 RX ADMIN — IBUPROFEN 800 MG: 800 TABLET, FILM COATED ORAL at 22:07

## 2025-04-30 ASSESSMENT — PAIN SCALES - GENERAL
PAINLEVEL_OUTOF10: 7
PAINLEVEL_OUTOF10: 7

## 2025-04-30 ASSESSMENT — ENCOUNTER SYMPTOMS
SHORTNESS OF BREATH: 0
ABDOMINAL PAIN: 0

## 2025-04-30 ASSESSMENT — PAIN DESCRIPTION - ORIENTATION: ORIENTATION: LEFT

## 2025-04-30 ASSESSMENT — PAIN DESCRIPTION - LOCATION: LOCATION: FOOT

## 2025-04-30 ASSESSMENT — PAIN - FUNCTIONAL ASSESSMENT: PAIN_FUNCTIONAL_ASSESSMENT: 0-10

## 2025-05-01 NOTE — ED NOTES
Pt presents to the ED with c/o L foot injury.  Pt reports a heavy glass object fell on her L foot.  Ecchymosis noted on top of foot.  Limited ROM of left toes.  Pt ambulatory from triage.  Pt reports hx of L foot surgery over a year ago.  Pt denies taking anything for pain PTA.    A&Ox4, respirations even and unlabored on RA.

## 2025-05-01 NOTE — ED PROVIDER NOTES
[facility] EMERGENCY DEPARTMENT     Emergency Department     Faculty Attestation        I performed a history and physical examination of the patient and discussed management with the resident. I reviewed the resident’s note and agree with the documented findings and plan of care. Any areas of disagreement are noted on the chart. I was personally present for the key portions of any procedures. I have documented in the chart those procedures where I was not present during the key portions. I have reviewed the emergency nurses triage note. I agree with the chief complaint, past medical history, past surgical history, allergies, medications, social and family history as documented unless otherwise noted below.  For Physician Assistant/ Nurse Practitioner cases/documentation I have personally evaluated this patient and have completed at least one if not all key elements of the E/M (history, physical exam, and MDM). Additional findings are as noted.      Vital Signs: BP: 123/79  Pulse: 68  Respirations: 14  Temp: 97.9 °F (36.6 °C) SpO2: 98 %  PCP:  Lizbeth Topete MD  Note Started: 4/30/25, 10:03 PM EDT    Pertinent Comments:     Patient is a 24-year-old female who had a large vase actually drop on her left foot yesterday.   Since then has had swelling and bruising and pain and here for evaluation.   Has been able to ambulate on it however with pain.   Neurovascular intact with capillary refill brisk and less than 2 seconds distally as well as strong DP/PT pulses palpated.   Distal strength is 5/5 with sensation light touch intact.   No pain at the base of the fifth metatarsal or calcaneus.   Pain is over the dorsum of the foot with significant bruising there.    Assessment/Plan: Patient with at least contusion to the dorsum of the foot will obtain x-rays to assess for bony involvement as well.   Attempt symptomatic control and reevaluate after      Critical

## 2025-05-01 NOTE — ED PROVIDER NOTES
[facility] EMERGENCY DEPARTMENT  Emergency Department Encounter  Emergency Medicine Resident     Pt Name:Tiffanie Ambriz  MRN: [id number]  Birthdate 2000  Date of evaluation: 4/30/25  PCP:  Lizbeth Topete MD  Note Started: 9:52 PM EDT      CHIEF COMPLAINT       Chief Complaint   Patient presents with    Foot Injury     Left       HISTORY OF PRESENT ILLNESS  (Location/Symptom, Timing/Onset, Context/Setting, Quality, Duration, Modifying Factors, Severity.)      Tiffanie Ambriz is a 24 y.o. female who presents with left foot pain.  Patient states that yesterday she dropped a ceramic vase on her foot.  Patient states that she has been having pain since then and now has a contusion developing therefore she came to the emergency department to be further evaluated.  Patient has been able to ambulate but states it is painful.  Patient denies taking any pain medication tonight.    PAST MEDICAL / SURGICAL / SOCIAL / FAMILY HISTORY      has a past medical history of Acute pyelonephritis, ADHD (attention deficit hyperactivity disorder), Allergic rhinitis, Arthritis, Asthma, Astigmatism, Obesity, Pes planovalgus, acquired, and Vesicoureteral reflux.     has a past surgical history that includes Foot surgery (Left) and Foot Osteotomy (Left, 11/24/2021).    Social History     Socioeconomic History    Marital status: Single     Spouse name: Not on file    Number of children: Not on file    Years of education: Not on file    Highest education level: Not on file   Occupational History    Not on file   Tobacco Use    Smoking status: Never    Smokeless tobacco: Never   Vaping Use    Vaping status: Some Days    Substances: Nicotine   Substance and Sexual Activity    Alcohol use: No    Drug use: No    Sexual activity: Not Currently     Partners: Male   Other Topics Concern    Not on file   Social History Narrative    Not on file     Social Drivers of Health     Financial Resource Strain: Low Risk  (10/15/2024)

## 2025-05-01 NOTE — DISCHARGE INSTRUCTIONS
You were seen here for left foot pain.  An x-ray was obtained and was negative for fracture.  You were given a postop shoe and crutches to use as needed for comfort.  You were given a prescription for Tylenol and Motrin.  Alternate between these medications as needed for pain.    You need to call and schedule follow-up appointment with a primary care doctor for soon as possible.    Return to the emergency department immediately if you experience worsening symptoms, develop any other symptoms, or if you have any other concerns.

## 2025-05-12 ENCOUNTER — HOSPITAL ENCOUNTER (EMERGENCY)
Age: 25
Discharge: HOME | End: 2025-05-12
Attending: STUDENT IN AN ORGANIZED HEALTH CARE EDUCATION/TRAINING PROGRAM
Payer: COMMERCIAL

## 2025-05-12 VITALS
SYSTOLIC BLOOD PRESSURE: 131 MMHG | DIASTOLIC BLOOD PRESSURE: 82 MMHG | RESPIRATION RATE: 20 BRPM | HEART RATE: 88 BPM | TEMPERATURE: 99.3 F | OXYGEN SATURATION: 100 %

## 2025-05-12 DIAGNOSIS — J30.1 SEASONAL ALLERGIC RHINITIS DUE TO POLLEN: ICD-10-CM

## 2025-05-12 DIAGNOSIS — J06.9 ACUTE UPPER RESPIRATORY INFECTION: ICD-10-CM

## 2025-05-12 DIAGNOSIS — J02.9 VIRAL PHARYNGITIS: Primary | ICD-10-CM

## 2025-05-12 PROCEDURE — 99283 EMERGENCY DEPT VISIT LOW MDM: CPT | Performed by: STUDENT IN AN ORGANIZED HEALTH CARE EDUCATION/TRAINING PROGRAM

## 2025-05-12 PROCEDURE — 6370000000 HC RX 637 (ALT 250 FOR IP)

## 2025-05-12 RX ORDER — GUAIFENESIN 600 MG/1
600 TABLET, EXTENDED RELEASE ORAL 2 TIMES DAILY
Qty: 30 TABLET | Refills: 0 | Status: SHIPPED | OUTPATIENT
Start: 2025-05-12 | End: 2025-05-27

## 2025-05-12 RX ORDER — GUAIFENESIN 600 MG/1
600 TABLET, EXTENDED RELEASE ORAL ONCE
Status: COMPLETED | OUTPATIENT
Start: 2025-05-12 | End: 2025-05-12

## 2025-05-12 RX ORDER — ACETAMINOPHEN 500 MG
1000 TABLET ORAL ONCE
Status: COMPLETED | OUTPATIENT
Start: 2025-05-12 | End: 2025-05-12

## 2025-05-12 RX ORDER — ACETAMINOPHEN 500 MG
1000 TABLET ORAL 3 TIMES DAILY
Qty: 100 TABLET | Refills: 1 | Status: SHIPPED | OUTPATIENT
Start: 2025-05-12

## 2025-05-12 RX ADMIN — BENZOCAINE AND MENTHOL 1 LOZENGE: 15; 3.6 LOZENGE ORAL at 17:27

## 2025-05-12 RX ADMIN — ACETAMINOPHEN 1000 MG: 500 TABLET, FILM COATED ORAL at 17:26

## 2025-05-12 RX ADMIN — GUAIFENESIN 600 MG: 600 TABLET, EXTENDED RELEASE ORAL at 17:26

## 2025-05-12 NOTE — ED PROVIDER NOTES
[facility]     Emergency Department     Faculty Attestation    I performed a history and physical examination of the patient and discussed management with the resident. I have reviewed and agree with the resident’s findings including all diagnostic interpretations, and treatment plans as written at the time of my review. Any areas of disagreement are noted on the chart. I was personally present for the key portions of any procedures. I have documented in the chart those procedures where I was not present during the key portions. For Physician Assistant/ Nurse Practitioner cases/documentation I have personally evaluated this patient and have completed at least one if not all key elements of the E/M (history, physical exam, and MDM). Additional findings are as noted.    PtName: Tiffanie Ambriz  MRN: [id number]  Birthdate 2000  Date of evaluation: 5/12/25  Note Started: 5:25 PM EDT    Primary Care Physician: Lizbeth Topete MD    Brief HPI:  25-year-old female presents emergency department with congestion, sore throat, right ear pain.  Symptoms for the past few days.  Otherwise healthy.    Pertinent Physical Exam Findings:  Vitals:    05/12/25 1655   BP: 131/82   Pulse: 88   Resp: 20   Temp: 99.3 °F (37.4 °C)   SpO2: 100%   Appears well, resting comfortably in bed, no acute distress, bilateral TMs are clear, mild pharyngeal erythema, no exudates, no peritonsillar abscess.  Lungs are clear to auscultation    Medical Decision Making: Patient is a 25 y.o. female presenting to the emergency department with congestion, ear pain, sore throat. The chart was reviewed for pertinent history relating to the chief complaint.  The patient appears well at this time in no acute distress, vitals are stable.  Symptoms likely secondary to viral syndrome or allergic response.  Plan to treat symptomatically and discharge.    All results, including labs (if ordered), imaging (if 
  Housing Stability: Unknown (10/15/2024)    Housing Stability Vital Sign     Unable to Pay for Housing in the Last Year: Not on file     Number of Times Moved in the Last Year: Not on file     Homeless in the Last Year: No       Family History   Problem Relation Age of Onset    Allergies Mother     Cancer Father     Obesity Father     Allergies Brother     Other Brother         learning problems    Asthma Brother     Arthritis Neg Hx     Birth Defects Neg Hx     Depression Neg Hx     Diabetes Neg Hx     Early Death Neg Hx     Hearing Loss Neg Hx     Heart Disease Neg Hx     High Blood Pressure Neg Hx     High Cholesterol Neg Hx     Kidney Disease Neg Hx     Learning Disabilities Neg Hx     Mental Illness Neg Hx     Mental Retardation Neg Hx     Miscarriages / Stillbirths Neg Hx     Stroke Neg Hx     Substance Abuse Neg Hx     Vision Loss Neg Hx     Breast Cancer Neg Hx     Uterine Cancer Neg Hx     Colon Cancer Neg Hx     Ovarian Cancer Neg Hx        Allergies:  Hydrocodone-acetaminophen    Home Medications:  Prior to Admission medications    Medication Sig Start Date End Date Taking? Authorizing Provider   Benzocaine-Menthol (CEPACOL) 6-10 MG LOZG lozenge Take 1 lozenge by mouth every 2 hours as needed for Sore Throat 5/12/25  Yes Quin Wagner MD   guaiFENesin (MUCINEX) 600 MG extended release tablet Take 1 tablet by mouth 2 times daily for 15 days 5/12/25 5/27/25 Yes Quin Wagner MD   acetaminophen (TYLENOL) 500 MG tablet Take 2 tablets by mouth 3 times daily 5/12/25  Yes Quin Wagner MD   ibuprofen (IBU) 800 MG tablet Take 1 tablet by mouth every 6 hours as needed for Pain 4/30/25   Gretchen King MD   amphetamine-dextroamphetamine (ADDERALL XR) 20 MG extended release capsule Take 1 capsule by mouth 2 times daily for 30 days. Max Daily Amount: 40 mg 11/13/24 12/13/24  Lizbeth Topete MD   albuterol sulfate HFA (PROVENTIL;VENTOLIN;PROAIR) 108 (90 Base) MCG/ACT inhaler Inhale 2 puffs into the lungs

## 2025-05-12 NOTE — DISCHARGE INSTRUCTIONS
You were seen in the emergency department today due to concerns of cough, congestion, right ear fullness.  We did discuss how you do not have any infection, this ear fullness that you are feeling is likely secondary to a viral infection.  We will discharge you with medications for your sore throat including a Cepacol lozenge.  This has benzocaine in it and menthol which can help soothe your throat.  You can also use Tylenol for body aches, and your viral symptoms.  I will also discharge you with Mucinex which is a medication which will help open up your nasal passages.  You can use this to help your symptoms.  Also use a warm washcloth over your face at home as this may help.  You can also do steam showers which can help open up your nasal passages as well.  Continue using your allergy medication.  Please follow-up with your family doctor after being seen in the emergency department.

## 2025-05-12 NOTE — ED NOTES
Pt states feeling unwell the past couple of days with cold symptoms. She states runny nose, cough, and sore throat. Pt is on stretcher with call light.

## 2025-05-13 ENCOUNTER — TELEPHONE (OUTPATIENT)
Age: 25
End: 2025-05-13

## 2025-05-13 NOTE — TELEPHONE ENCOUNTER
Called patient per provider to have patient schedule an appointment from Urgent care and for med refills.  Provider would like to know if patient will be staying with [facility] or [facility].

## 2025-07-19 ENCOUNTER — HOSPITAL ENCOUNTER (EMERGENCY)
Age: 25
Discharge: HOME | End: 2025-07-19
Attending: EMERGENCY MEDICINE
Payer: COMMERCIAL

## 2025-07-19 VITALS
WEIGHT: 220 LBS | RESPIRATION RATE: 16 BRPM | OXYGEN SATURATION: 98 % | HEIGHT: 63 IN | HEART RATE: 54 BPM | TEMPERATURE: 98.4 F | SYSTOLIC BLOOD PRESSURE: 104 MMHG | DIASTOLIC BLOOD PRESSURE: 61 MMHG | BODY MASS INDEX: 38.98 KG/M2

## 2025-07-19 DIAGNOSIS — K52.9 GASTROENTERITIS: Primary | ICD-10-CM

## 2025-07-19 LAB
ALBUMIN SERPL-MCNC: 4.2 G/DL (ref 3.5–5.2)
ALP SERPL-CCNC: 70 U/L (ref 35–104)
ALT SERPL-CCNC: 14 U/L (ref 10–35)
ANION GAP SERPL CALCULATED.3IONS-SCNC: 15 MMOL/L (ref 9–16)
AST SERPL-CCNC: 16 U/L (ref 10–35)
BACTERIA URNS QL MICRO: ABNORMAL
BASOPHILS # BLD: <0.03 K/UL (ref 0–0.2)
BASOPHILS NFR BLD: 0 % (ref 0–2)
BILIRUB SERPL-MCNC: 0.6 MG/DL (ref 0–1.2)
BILIRUB UR QL STRIP: NEGATIVE
BUN SERPL-MCNC: 8 MG/DL (ref 6–20)
CALCIUM SERPL-MCNC: 9.3 MG/DL (ref 8.6–10.4)
CASTS #/AREA URNS LPF: ABNORMAL /LPF
CHLORIDE SERPL-SCNC: 105 MMOL/L (ref 98–107)
CLARITY UR: CLEAR
CO2 SERPL-SCNC: 18 MMOL/L (ref 20–31)
COLOR UR: YELLOW
CREAT SERPL-MCNC: 0.6 MG/DL (ref 0.7–1.2)
EOSINOPHIL # BLD: <0.03 K/UL (ref 0–0.44)
EOSINOPHILS RELATIVE PERCENT: 0 % (ref 0–4)
EPI CELLS #/AREA URNS HPF: ABNORMAL /HPF
ERYTHROCYTE [DISTWIDTH] IN BLOOD BY AUTOMATED COUNT: 12.4 % (ref 11.5–14.9)
GFR, ESTIMATED: >90 ML/MIN/1.73M2
GLUCOSE SERPL-MCNC: 96 MG/DL (ref 74–99)
GLUCOSE UR STRIP-MCNC: NEGATIVE MG/DL
HCG SERPL QL: NEGATIVE
HCT VFR BLD AUTO: 42.1 % (ref 36–46)
HGB BLD-MCNC: 14.2 G/DL (ref 12–16)
HGB UR QL STRIP.AUTO: NEGATIVE
IMM GRANULOCYTES # BLD AUTO: <0.03 K/UL (ref 0–0.3)
IMM GRANULOCYTES NFR BLD: 0 %
KETONES UR STRIP-MCNC: NEGATIVE MG/DL
LEUKOCYTE ESTERASE UR QL STRIP: NEGATIVE
LIPASE SERPL-CCNC: 16 U/L (ref 13–60)
LYMPHOCYTES NFR BLD: 1.18 K/UL (ref 1.1–3.7)
LYMPHOCYTES RELATIVE PERCENT: 18 % (ref 24–44)
MAGNESIUM SERPL-MCNC: 1.9 MG/DL (ref 1.6–2.6)
MCH RBC QN AUTO: 30 PG (ref 26–34)
MCHC RBC AUTO-ENTMCNC: 33.7 G/DL (ref 31–37)
MCV RBC AUTO: 89 FL (ref 80–100)
MONOCYTES NFR BLD: 0.44 K/UL (ref 0.1–1.2)
MONOCYTES NFR BLD: 7 % (ref 3–12)
NEUTROPHILS NFR BLD: 75 % (ref 36–66)
NEUTS SEG NFR BLD: 4.85 K/UL (ref 1.5–8.1)
NITRITE UR QL STRIP: NEGATIVE
NRBC BLD-RTO: 0 PER 100 WBC
PH UR STRIP: 8.5 [PH] (ref 5–8)
PLATELET # BLD AUTO: 174 K/UL (ref 150–450)
PMV BLD AUTO: 10.5 FL (ref 8–13.5)
POTASSIUM SERPL-SCNC: 3.5 MMOL/L (ref 3.7–5.3)
PROT SERPL-MCNC: 6.9 G/DL (ref 6.6–8.7)
PROT UR STRIP-MCNC: ABNORMAL MG/DL
RBC # BLD AUTO: 4.73 M/UL (ref 3.95–5.11)
RBC #/AREA URNS HPF: ABNORMAL /HPF
SODIUM SERPL-SCNC: 138 MMOL/L (ref 136–145)
SP GR UR STRIP: 1.02 (ref 1–1.03)
UROBILINOGEN UR STRIP-ACNC: NORMAL EU/DL (ref 0–1)
WBC #/AREA URNS HPF: ABNORMAL /HPF
WBC OTHER # BLD: 6.5 K/UL (ref 3.5–11)

## 2025-07-19 PROCEDURE — 36415 COLL VENOUS BLD VENIPUNCTURE: CPT

## 2025-07-19 PROCEDURE — 85025 COMPLETE CBC W/AUTO DIFF WBC: CPT

## 2025-07-19 PROCEDURE — 81001 URINALYSIS AUTO W/SCOPE: CPT

## 2025-07-19 PROCEDURE — 6360000002 HC RX W HCPCS: Performed by: EMERGENCY MEDICINE

## 2025-07-19 PROCEDURE — 96374 THER/PROPH/DIAG INJ IV PUSH: CPT

## 2025-07-19 PROCEDURE — 96375 TX/PRO/DX INJ NEW DRUG ADDON: CPT

## 2025-07-19 PROCEDURE — 83690 ASSAY OF LIPASE: CPT

## 2025-07-19 PROCEDURE — 83735 ASSAY OF MAGNESIUM: CPT

## 2025-07-19 PROCEDURE — 84703 CHORIONIC GONADOTROPIN ASSAY: CPT

## 2025-07-19 PROCEDURE — 80053 COMPREHEN METABOLIC PANEL: CPT

## 2025-07-19 PROCEDURE — 99283 EMERGENCY DEPT VISIT LOW MDM: CPT

## 2025-07-19 PROCEDURE — 2580000003 HC RX 258: Performed by: EMERGENCY MEDICINE

## 2025-07-19 RX ORDER — ONDANSETRON 2 MG/ML
4 INJECTION INTRAMUSCULAR; INTRAVENOUS ONCE
Status: COMPLETED | OUTPATIENT
Start: 2025-07-19 | End: 2025-07-19

## 2025-07-19 RX ORDER — ONDANSETRON 4 MG/1
4 TABLET, ORALLY DISINTEGRATING ORAL 3 TIMES DAILY PRN
Qty: 21 TABLET | Refills: 0 | Status: SHIPPED | OUTPATIENT
Start: 2025-07-19

## 2025-07-19 RX ORDER — 0.9 % SODIUM CHLORIDE 0.9 %
1000 INTRAVENOUS SOLUTION INTRAVENOUS ONCE
Status: COMPLETED | OUTPATIENT
Start: 2025-07-19 | End: 2025-07-19

## 2025-07-19 RX ORDER — KETOROLAC TROMETHAMINE 30 MG/ML
15 INJECTION, SOLUTION INTRAMUSCULAR; INTRAVENOUS ONCE
Status: COMPLETED | OUTPATIENT
Start: 2025-07-19 | End: 2025-07-19

## 2025-07-19 RX ADMIN — KETOROLAC TROMETHAMINE 15 MG: 30 INJECTION, SOLUTION INTRAMUSCULAR at 11:04

## 2025-07-19 RX ADMIN — SODIUM CHLORIDE 1000 ML: 0.9 INJECTION, SOLUTION INTRAVENOUS at 11:02

## 2025-07-19 RX ADMIN — ONDANSETRON 4 MG: 2 INJECTION, SOLUTION INTRAMUSCULAR; INTRAVENOUS at 11:03

## 2025-07-19 ASSESSMENT — PAIN DESCRIPTION - LOCATION
LOCATION: ABDOMEN

## 2025-07-19 ASSESSMENT — PAIN SCALES - GENERAL
PAINLEVEL_OUTOF10: 7
PAINLEVEL_OUTOF10: 7
PAINLEVEL_OUTOF10: 6

## 2025-07-19 ASSESSMENT — ENCOUNTER SYMPTOMS
SHORTNESS OF BREATH: 0
DIARRHEA: 1
COUGH: 0
VOMITING: 1
ABDOMINAL PAIN: 1
NAUSEA: 1

## 2025-07-19 ASSESSMENT — PAIN - FUNCTIONAL ASSESSMENT
PAIN_FUNCTIONAL_ASSESSMENT: 0-10
PAIN_FUNCTIONAL_ASSESSMENT: 0-10

## 2025-07-19 ASSESSMENT — PAIN DESCRIPTION - ORIENTATION
ORIENTATION: RIGHT
ORIENTATION: RIGHT

## 2025-07-19 ASSESSMENT — PAIN DESCRIPTION - DESCRIPTORS
DESCRIPTORS: SHARP;ACHING;SHOOTING
DESCRIPTORS: ACHING;SHARP;SHOOTING

## 2025-07-19 ASSESSMENT — PAIN DESCRIPTION - PAIN TYPE: TYPE: ACUTE PAIN

## 2025-07-19 ASSESSMENT — PAIN DESCRIPTION - FREQUENCY: FREQUENCY: CONTINUOUS

## 2025-07-19 NOTE — ED TRIAGE NOTES
Mode of arrival (squad #, walk in, police, etc) : [facility], picked up from work         Chief complaint(s): patient reports she ate [employer] around midnight, she woke this morning at 0500 with right side abdominal pain and has since had nausea, vomiting and diarrhea.   States she felt per her norm yesterday.

## 2025-07-19 NOTE — ED PROVIDER NOTES
EMERGENCY DEPARTMENT ENCOUNTER    Pt Name: Tiffanie Ambriz  MRN: [id number]  Birthdate 2000  Date of evaluation: 7/19/25  CHIEF COMPLAINT       Chief Complaint   Patient presents with    Abdominal Pain    Nausea & Vomiting    Diarrhea     HISTORY OF PRESENT ILLNESS   Patient is presenting for abdominal pain with nausea and vomiting and diarrhea.  Patient initially started having vomiting and watery diarrhea after eating [employer] last night.  She then developed a right sided abdominal pain.  She denies dysuria, increased urinary frequency or urgency.  She denies any alcohol intake.  She denies fever or chills.    The history is provided by the patient.           REVIEW OF SYSTEMS     Review of Systems   Constitutional:  Negative for chills and fever.   HENT:  Negative for congestion.    Eyes:  Negative for visual disturbance.   Respiratory:  Negative for cough and shortness of breath.    Cardiovascular:  Negative for chest pain.   Gastrointestinal:  Positive for abdominal pain, diarrhea, nausea and vomiting.   Genitourinary:  Positive for flank pain. Negative for dysuria, frequency and urgency.     PASTMEDICAL HISTORY     Past Medical History:   Diagnosis Date    Acute pyelonephritis 11/8 - 11/11/2004    ; normal cystogram and vcug 2006    ADHD (attention deficit hyperactivity disorder) 5/2008    Allergic rhinitis 8/2010    Arthritis     Asthma 8/2011    abnormal PFT    Astigmatism     Obesity 9/3/2014    Pes planovalgus, acquired 12/2010    referred to ortho    Vesicoureteral reflux 11/9/04    normal cysto 11/2/06, and normal vcug 5/19/06.     Past Problem List  Patient Active Problem List   Diagnosis Code    ADHD (attention deficit hyperactivity disorder) F90.9    Asthma J45.909    Pes planovalgus, acquired M21.40    Astigmatism H52.209    Seasonal allergies J30.2    Morbidly obese ([facility]) E66.01    Acne vulgaris L70.0    Congenital tarsal coalition Q66.89    Eczema L30.9    IUD (intrauterine device) in place 
97

## 2025-08-05 ENCOUNTER — APPOINTMENT (OUTPATIENT)
Dept: CT IMAGING | Age: 25
End: 2025-08-05
Payer: COMMERCIAL

## 2025-08-05 ENCOUNTER — TRANSCRIBE ORDERS (OUTPATIENT)
Dept: ADMINISTRATIVE | Age: 25
End: 2025-08-05

## 2025-08-05 ENCOUNTER — HOSPITAL ENCOUNTER (EMERGENCY)
Age: 25
Discharge: HOME | End: 2025-08-05
Attending: STUDENT IN AN ORGANIZED HEALTH CARE EDUCATION/TRAINING PROGRAM
Payer: COMMERCIAL

## 2025-08-05 VITALS
OXYGEN SATURATION: 100 % | WEIGHT: 230 LBS | RESPIRATION RATE: 16 BRPM | TEMPERATURE: 97.9 F | SYSTOLIC BLOOD PRESSURE: 112 MMHG | DIASTOLIC BLOOD PRESSURE: 69 MMHG | HEIGHT: 63 IN | BODY MASS INDEX: 40.75 KG/M2 | HEART RATE: 55 BPM

## 2025-08-05 DIAGNOSIS — M79.672 LEFT FOOT PAIN: ICD-10-CM

## 2025-08-05 DIAGNOSIS — M79.672 LEFT FOOT PAIN: Primary | ICD-10-CM

## 2025-08-05 DIAGNOSIS — R60.0 LOCALIZED EDEMA: ICD-10-CM

## 2025-08-05 DIAGNOSIS — Q66.89 TALIPES, UNSPECIFIED: Primary | ICD-10-CM

## 2025-08-05 DIAGNOSIS — Q66.89 OTHER SPECIFIED CONGENITAL DEFORMITIES OF FEET: ICD-10-CM

## 2025-08-05 DIAGNOSIS — M54.6 ACUTE MIDLINE THORACIC BACK PAIN: ICD-10-CM

## 2025-08-05 DIAGNOSIS — S90.32XA CONTUSION OF LEFT FOOT, INITIAL ENCOUNTER: ICD-10-CM
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
## 2025-08-05 DIAGNOSIS — R10.84 GENERALIZED ABDOMINAL PAIN: ICD-10-CM

## 2025-08-05 LAB
AMORPH SED URNS QL MICRO: ABNORMAL
ANION GAP SERPL CALCULATED.3IONS-SCNC: 12 MMOL/L (ref 9–16)
BACTERIA URNS QL MICRO: ABNORMAL
BASOPHILS # BLD: 0 K/UL (ref 0–0.2)
BASOPHILS NFR BLD: 0 % (ref 0–2)
BILIRUB UR QL STRIP: NEGATIVE
BUN SERPL-MCNC: 10 MG/DL (ref 6–20)
CALCIUM SERPL-MCNC: 9 MG/DL (ref 8.6–10.4)
CHLORIDE SERPL-SCNC: 105 MMOL/L (ref 98–107)
CLARITY UR: CLEAR
CO2 SERPL-SCNC: 22 MMOL/L (ref 20–31)
COLOR UR: YELLOW
CREAT SERPL-MCNC: 0.8 MG/DL (ref 0.7–1.2)
EOSINOPHIL # BLD: 0.1 K/UL (ref 0–0.4)
EOSINOPHILS RELATIVE PERCENT: 1 % (ref 1–4)
EPI CELLS #/AREA URNS HPF: ABNORMAL /HPF (ref 0–5)
ERYTHROCYTE [DISTWIDTH] IN BLOOD BY AUTOMATED COUNT: 13 % (ref 12.5–15.4)
GFR, ESTIMATED: >90 ML/MIN/1.73M2
GLUCOSE SERPL-MCNC: 104 MG/DL (ref 74–99)
GLUCOSE UR STRIP-MCNC: NEGATIVE MG/DL
HCG UR QL: NEGATIVE
HCT VFR BLD AUTO: 42.3 % (ref 36–46)
HGB BLD-MCNC: 14.4 G/DL (ref 12–16)
HGB UR QL STRIP.AUTO: NEGATIVE
KETONES UR STRIP-MCNC: NEGATIVE MG/DL
LEUKOCYTE ESTERASE UR QL STRIP: NEGATIVE
LYMPHOCYTES NFR BLD: 1.3 K/UL (ref 1–4.8)
LYMPHOCYTES RELATIVE PERCENT: 21 % (ref 24–44)
MCH RBC QN AUTO: 29.8 PG (ref 26–34)
MCHC RBC AUTO-ENTMCNC: 34.1 G/DL (ref 31–37)
MCV RBC AUTO: 87.3 FL (ref 80–100)
MONOCYTES NFR BLD: 0.4 K/UL (ref 0.1–1.2)
MONOCYTES NFR BLD: 6 % (ref 2–11)
NEUTROPHILS NFR BLD: 72 % (ref 36–66)
NEUTS SEG NFR BLD: 4.6 K/UL (ref 1.8–7.7)
NITRITE UR QL STRIP: NEGATIVE
PH UR STRIP: 6.5 [PH] (ref 5–8)
PLATELET # BLD AUTO: 195 K/UL (ref 140–450)
PMV BLD AUTO: 8.4 FL (ref 6–12)
POTASSIUM SERPL-SCNC: 4 MMOL/L (ref 3.7–5.3)
PROT UR STRIP-MCNC: ABNORMAL MG/DL
RBC # BLD AUTO: 4.85 M/UL (ref 4–5.2)
RBC #/AREA URNS HPF: ABNORMAL /HPF (ref 0–2)
SODIUM SERPL-SCNC: 139 MMOL/L (ref 136–145)
SP GR UR STRIP: 1.02 (ref 1–1.03)
UROBILINOGEN UR STRIP-ACNC: NORMAL EU/DL (ref 0–1)
WBC #/AREA URNS HPF: ABNORMAL /HPF (ref 0–5)
WBC OTHER # BLD: 6.3 K/UL (ref 3.5–11)

## 2025-08-05 PROCEDURE — 85025 COMPLETE CBC W/AUTO DIFF WBC: CPT

## 2025-08-05 PROCEDURE — 74176 CT ABD & PELVIS W/O CONTRAST: CPT

## 2025-08-05 PROCEDURE — 36415 COLL VENOUS BLD VENIPUNCTURE: CPT

## 2025-08-05 PROCEDURE — 6360000002 HC RX W HCPCS

## 2025-08-05 PROCEDURE — 80048 BASIC METABOLIC PNL TOTAL CA: CPT

## 2025-08-05 PROCEDURE — 81025 URINE PREGNANCY TEST: CPT

## 2025-08-05 PROCEDURE — 72128 CT CHEST SPINE W/O DYE: CPT

## 2025-08-05 PROCEDURE — 99284 EMERGENCY DEPT VISIT MOD MDM: CPT | Performed by: STUDENT IN AN ORGANIZED HEALTH CARE EDUCATION/TRAINING PROGRAM

## 2025-08-05 PROCEDURE — 96372 THER/PROPH/DIAG INJ SC/IM: CPT | Performed by: STUDENT IN AN ORGANIZED HEALTH CARE EDUCATION/TRAINING PROGRAM

## 2025-08-05 PROCEDURE — 81001 URINALYSIS AUTO W/SCOPE: CPT

## 2025-08-05 RX ORDER — ORPHENADRINE CITRATE 30 MG/ML
60 INJECTION INTRAMUSCULAR; INTRAVENOUS ONCE
Status: COMPLETED | OUTPATIENT
Start: 2025-08-05 | End: 2025-08-05

## 2025-08-05 RX ORDER — KETOROLAC TROMETHAMINE 15 MG/ML
15 INJECTION, SOLUTION INTRAMUSCULAR; INTRAVENOUS ONCE
Status: COMPLETED | OUTPATIENT
Start: 2025-08-05 | End: 2025-08-05

## 2025-08-05 RX ORDER — METHOCARBAMOL 750 MG/1
750 TABLET, FILM COATED ORAL 4 TIMES DAILY
Qty: 40 TABLET | Refills: 0 | Status: SHIPPED | OUTPATIENT
Start: 2025-08-05 | End: 2025-08-15

## 2025-08-05 RX ADMIN — KETOROLAC TROMETHAMINE 15 MG: 15 INJECTION, SOLUTION INTRAMUSCULAR; INTRAVENOUS at 18:04

## 2025-08-05 RX ADMIN — ORPHENADRINE CITRATE 60 MG: 60 INJECTION INTRAMUSCULAR; INTRAVENOUS at 20:36

## 2025-08-05 ASSESSMENT — PAIN - FUNCTIONAL ASSESSMENT: PAIN_FUNCTIONAL_ASSESSMENT: 0-10

## 2025-08-05 ASSESSMENT — PAIN SCALES - GENERAL
PAINLEVEL_OUTOF10: 8

## 2025-08-05 ASSESSMENT — PAIN DESCRIPTION - LOCATION
LOCATION: ABDOMEN
LOCATION: BACK;HIP

## 2025-08-05 ASSESSMENT — PAIN DESCRIPTION - ORIENTATION: ORIENTATION: RIGHT;LEFT

## 2025-08-22 ENCOUNTER — HOSPITAL ENCOUNTER (OUTPATIENT)
Dept: MRI IMAGING | Age: 25
Discharge: HOME | End: 2025-08-24
Attending: PODIATRIST
Payer: COMMERCIAL

## 2025-08-22 DIAGNOSIS — R60.0 LOCALIZED EDEMA: ICD-10-CM

## 2025-08-22 DIAGNOSIS — S90.32XA CONTUSION OF LEFT FOOT, INITIAL ENCOUNTER: ICD-10-CM

## 2025-08-22 DIAGNOSIS — Q66.89 TALIPES, UNSPECIFIED: ICD-10-CM

## 2025-08-22 DIAGNOSIS — M79.672 LEFT FOOT PAIN: ICD-10-CM

## 2025-08-22 DIAGNOSIS — Q66.89 OTHER SPECIFIED CONGENITAL DEFORMITIES OF FEET: ICD-10-CM

## 2025-08-22 PROCEDURE — 73718 MRI LOWER EXTREMITY W/O DYE: CPT

## 2025-08-22 PROCEDURE — 73721 MRI JNT OF LWR EXTRE W/O DYE: CPT

## (undated) DEVICE — BLANKET WRM W29.9XL79.1IN UP BODY FORC AIR MISTRAL-AIR

## (undated) DEVICE — INTENDED FOR TISSUE SEPARATION, AND OTHER PROCEDURES THAT REQUIRE A SHARP SURGICAL BLADE TO PUNCTURE OR CUT.: Brand: BARD-PARKER ® CARBON RIB-BACK BLADES

## (undated) DEVICE — SOLUTION IV IRRIG 500ML 0.9% SODIUM CHL 2F7123

## (undated) DEVICE — PADDING CAST W6INXL4YD POLY POR SPUN DACRON SYN VERSATILE

## (undated) DEVICE — ELECTRODE ES L3IN S STL BLDE INSUL DISP VALLEYLAB EDGE

## (undated) DEVICE — SPLINT ORTH W4XL15IN WHT FBRGLS CNFRM STR LTWT SCOTCHCAST

## (undated) DEVICE — GAUZE,SPONGE,4"X4",16PLY,XRAY,STRL,LF: Brand: MEDLINE

## (undated) DEVICE — GLOVE SURG SZ 6 CRM LTX FREE POLYISOPRENE POLYMER BEAD ANTI

## (undated) DEVICE — SUTURE VCRL SZ 3-0 L27IN ABSRB UD L26MM SH 1/2 CIR J416H

## (undated) DEVICE — GOWN,AURORA,NONREINFORCED,LARGE: Brand: MEDLINE

## (undated) DEVICE — SKIN PREP TRAY W/CHG: Brand: MEDLINE INDUSTRIES, INC.

## (undated) DEVICE — TUBING, SUCTION, 1/4" X 12', STRAIGHT: Brand: MEDLINE

## (undated) DEVICE — TOWEL,OR,DSP,ST,BLUE,DLX,XR,4/PK,20PK/CS: Brand: MEDLINE

## (undated) DEVICE — BANDAGE COBAN 4 IN COMPR W4INXL5YD FOAM COHESIVE QUIK STK SELF ADH SFT

## (undated) DEVICE — SUTURE PDS II SZ 4-0 L27IN ABSRB UD FS-2 L19MM 3/8 CIR REV Z422H

## (undated) DEVICE — 3M™ STERI-DRAPE™ U-DRAPE 1015: Brand: STERI-DRAPE™

## (undated) DEVICE — SUTURE VCRL SZ 4-0 L18IN ABSRB UD POLYGLACTIN 910 BRAID TIE J643H

## (undated) DEVICE — SUTURE VCRL SZ 4-0 L27IN ABSRB UD L26MM SH 1/2 CIR J415H

## (undated) DEVICE — WAX,BONE,SYNTHETIC: Brand: MEDLINE INDUSTRIES

## (undated) DEVICE — INTENDED TO AID IN THE PASSING OF SUTURES THROUGH BONE AND SOFT TISSUE DURING ORTHOPEDIC SURGERY: Brand: HOFFEE SUTURE RETRIEVER

## (undated) DEVICE — PADDING,UNDERCAST,COTTON, 4"X4YD STERILE: Brand: MEDLINE

## (undated) DEVICE — Device

## (undated) DEVICE — BNDG,ELSTC,MATRIX,STRL,4"X5YD,LF,HOOK&LP: Brand: MEDLINE

## (undated) DEVICE — DRAPE,REIN 53X77,STERILE: Brand: MEDLINE

## (undated) DEVICE — APPLICATOR MEDICATED 26 CC SOLUTION HI LT ORNG CHLORAPREP

## (undated) DEVICE — SUTURE VCRL SZ 5-0 L18IN ABSRB UD P-3 L13MM 3/8 CIR PRIM J493H

## (undated) DEVICE — STRIP SKIN CLSR W0.25XL4IN WHT SPUNBOUND FBR NYL HI ADH

## (undated) DEVICE — SUTURE N ABSRB BRAIDED 2-0 OS-4 30 IN GRN ETHBND EXCEL X519H

## (undated) DEVICE — STOCKINETTE,DOUBLE PLY,6X48,STERILE: Brand: MEDLINE

## (undated) DEVICE — YANKAUER,FLEXIBLE HANDLE,REGLR CAPACITY: Brand: MEDLINE INDUSTRIES, INC.